# Patient Record
Sex: FEMALE | Race: WHITE | NOT HISPANIC OR LATINO | Employment: FULL TIME | URBAN - METROPOLITAN AREA
[De-identification: names, ages, dates, MRNs, and addresses within clinical notes are randomized per-mention and may not be internally consistent; named-entity substitution may affect disease eponyms.]

---

## 2020-07-27 ENCOUNTER — TELEPHONE (OUTPATIENT)
Dept: PEDIATRICS | Facility: CLINIC | Age: 20
End: 2020-07-27

## 2020-07-27 NOTE — TELEPHONE ENCOUNTER
Spoke to pt. Advised that she is due for 2nd meningitis vaccine. Unable to print  Certified shot record. Advised a vaccination summary report is printed and ready for . Verbalized understanding.

## 2020-07-27 NOTE — TELEPHONE ENCOUNTER
----- Message from Yobany Hirsch sent at 7/27/2020 11:54 AM CDT -----  Regarding: Patient Shot Records  Contact: Rosita Thomas  Pt would like to know if your office has her shot records. 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

## 2023-02-05 ENCOUNTER — HOSPITAL ENCOUNTER (OUTPATIENT)
Facility: HOSPITAL | Age: 23
LOS: 1 days | Discharge: HOME | End: 2023-02-07
Attending: EMERGENCY MEDICINE | Admitting: INTERNAL MEDICINE

## 2023-02-05 DIAGNOSIS — R07.89 FEELING OF CHEST TIGHTNESS: ICD-10-CM

## 2023-02-05 DIAGNOSIS — K80.00 CALCULUS OF GALLBLADDER WITH ACUTE CHOLECYSTITIS WITHOUT OBSTRUCTION: Primary | ICD-10-CM

## 2023-02-05 DIAGNOSIS — K81.9 CHOLECYSTITIS: ICD-10-CM

## 2023-02-05 LAB
ALBUMIN SERPL BCP-MCNC: 4.7 G/DL (ref 3.5–5.2)
ALP SERPL-CCNC: 55 U/L (ref 55–135)
ALT SERPL W/O P-5'-P-CCNC: 19 U/L (ref 10–44)
ANION GAP SERPL CALC-SCNC: 9 MMOL/L (ref 8–16)
AST SERPL-CCNC: 21 U/L (ref 10–40)
B-HCG UR QL: NEGATIVE
BASOPHILS # BLD AUTO: 0.03 K/UL (ref 0–0.2)
BASOPHILS NFR BLD: 0.4 % (ref 0–1.9)
BILIRUB SERPL-MCNC: 0.7 MG/DL (ref 0.1–1)
BILIRUB UR QL STRIP: NEGATIVE
BUN SERPL-MCNC: 9 MG/DL (ref 6–20)
CALCIUM SERPL-MCNC: 9.5 MG/DL (ref 8.7–10.5)
CHLORIDE SERPL-SCNC: 105 MMOL/L (ref 95–110)
CLARITY UR: CLEAR
CO2 SERPL-SCNC: 25 MMOL/L (ref 23–29)
COLOR UR: YELLOW
CREAT SERPL-MCNC: 0.9 MG/DL (ref 0.5–1.4)
CTP QC/QA: YES
DIFFERENTIAL METHOD: NORMAL
EOSINOPHIL # BLD AUTO: 0.1 K/UL (ref 0–0.5)
EOSINOPHIL NFR BLD: 0.8 % (ref 0–8)
ERYTHROCYTE [DISTWIDTH] IN BLOOD BY AUTOMATED COUNT: 12.6 % (ref 11.5–14.5)
EST. GFR  (NO RACE VARIABLE): >60 ML/MIN/1.73 M^2
GLUCOSE SERPL-MCNC: 85 MG/DL (ref 70–110)
GLUCOSE UR QL STRIP: NEGATIVE
HCT VFR BLD AUTO: 44.9 % (ref 37–48.5)
HGB BLD-MCNC: 14.9 G/DL (ref 12–16)
HGB UR QL STRIP: NEGATIVE
IMM GRANULOCYTES # BLD AUTO: 0.01 K/UL (ref 0–0.04)
IMM GRANULOCYTES NFR BLD AUTO: 0.1 % (ref 0–0.5)
KETONES UR QL STRIP: NEGATIVE
LEUKOCYTE ESTERASE UR QL STRIP: NEGATIVE
LIPASE SERPL-CCNC: 32 U/L (ref 4–60)
LYMPHOCYTES # BLD AUTO: 1.9 K/UL (ref 1–4.8)
LYMPHOCYTES NFR BLD: 26 % (ref 18–48)
MCH RBC QN AUTO: 29.7 PG (ref 27–31)
MCHC RBC AUTO-ENTMCNC: 33.2 G/DL (ref 32–36)
MCV RBC AUTO: 90 FL (ref 82–98)
MONOCYTES # BLD AUTO: 0.5 K/UL (ref 0.3–1)
MONOCYTES NFR BLD: 6.6 % (ref 4–15)
NEUTROPHILS # BLD AUTO: 4.9 K/UL (ref 1.8–7.7)
NEUTROPHILS NFR BLD: 66.1 % (ref 38–73)
NITRITE UR QL STRIP: NEGATIVE
NRBC BLD-RTO: 0 /100 WBC
PH UR STRIP: 7 [PH] (ref 5–8)
PLATELET # BLD AUTO: 338 K/UL (ref 150–450)
PMV BLD AUTO: 11.5 FL (ref 9.2–12.9)
POTASSIUM SERPL-SCNC: 3.6 MMOL/L (ref 3.5–5.1)
PROT SERPL-MCNC: 8.6 G/DL (ref 6–8.4)
PROT UR QL STRIP: NEGATIVE
RBC # BLD AUTO: 5.01 M/UL (ref 4–5.4)
SODIUM SERPL-SCNC: 139 MMOL/L (ref 136–145)
SP GR UR STRIP: 1.01 (ref 1–1.03)
URN SPEC COLLECT METH UR: NORMAL
UROBILINOGEN UR STRIP-ACNC: NEGATIVE EU/DL
WBC # BLD AUTO: 7.42 K/UL (ref 3.9–12.7)

## 2023-02-05 PROCEDURE — 80053 COMPREHEN METABOLIC PANEL: CPT | Performed by: NURSE PRACTITIONER

## 2023-02-05 PROCEDURE — 81025 URINE PREGNANCY TEST: CPT | Performed by: NURSE PRACTITIONER

## 2023-02-05 PROCEDURE — 25000003 PHARM REV CODE 250: Performed by: NURSE PRACTITIONER

## 2023-02-05 PROCEDURE — 83690 ASSAY OF LIPASE: CPT | Performed by: NURSE PRACTITIONER

## 2023-02-05 PROCEDURE — 96365 THER/PROPH/DIAG IV INF INIT: CPT

## 2023-02-05 PROCEDURE — 99285 EMERGENCY DEPT VISIT HI MDM: CPT | Mod: 25

## 2023-02-05 PROCEDURE — 63600175 PHARM REV CODE 636 W HCPCS: Performed by: NURSE PRACTITIONER

## 2023-02-05 PROCEDURE — 96375 TX/PRO/DX INJ NEW DRUG ADDON: CPT

## 2023-02-05 PROCEDURE — 12000002 HC ACUTE/MED SURGE SEMI-PRIVATE ROOM

## 2023-02-05 PROCEDURE — 85025 COMPLETE CBC W/AUTO DIFF WBC: CPT | Performed by: NURSE PRACTITIONER

## 2023-02-05 PROCEDURE — 81003 URINALYSIS AUTO W/O SCOPE: CPT | Performed by: NURSE PRACTITIONER

## 2023-02-05 RX ORDER — FAMOTIDINE 10 MG/ML
20 INJECTION INTRAVENOUS EVERY 12 HOURS
Status: DISCONTINUED | OUTPATIENT
Start: 2023-02-05 | End: 2023-02-07 | Stop reason: HOSPADM

## 2023-02-05 RX ORDER — MORPHINE SULFATE 4 MG/ML
4 INJECTION, SOLUTION INTRAMUSCULAR; INTRAVENOUS
Status: COMPLETED | OUTPATIENT
Start: 2023-02-05 | End: 2023-02-05

## 2023-02-05 RX ORDER — TALC
6 POWDER (GRAM) TOPICAL NIGHTLY PRN
Status: DISCONTINUED | OUTPATIENT
Start: 2023-02-05 | End: 2023-02-07 | Stop reason: HOSPADM

## 2023-02-05 RX ORDER — POLYETHYLENE GLYCOL 3350 17 G/17G
17 POWDER, FOR SOLUTION ORAL DAILY
Status: DISCONTINUED | OUTPATIENT
Start: 2023-02-06 | End: 2023-02-07 | Stop reason: HOSPADM

## 2023-02-05 RX ORDER — SODIUM CHLORIDE, SODIUM LACTATE, POTASSIUM CHLORIDE, CALCIUM CHLORIDE 600; 310; 30; 20 MG/100ML; MG/100ML; MG/100ML; MG/100ML
INJECTION, SOLUTION INTRAVENOUS CONTINUOUS
Status: DISCONTINUED | OUTPATIENT
Start: 2023-02-05 | End: 2023-02-07 | Stop reason: HOSPADM

## 2023-02-05 RX ORDER — PROCHLORPERAZINE EDISYLATE 5 MG/ML
5 INJECTION INTRAMUSCULAR; INTRAVENOUS EVERY 6 HOURS PRN
Status: DISCONTINUED | OUTPATIENT
Start: 2023-02-05 | End: 2023-02-07 | Stop reason: HOSPADM

## 2023-02-05 RX ORDER — POLYETHYLENE GLYCOL 3350 17 G/17G
17 POWDER, FOR SOLUTION ORAL DAILY
Status: DISCONTINUED | OUTPATIENT
Start: 2023-02-06 | End: 2023-02-05

## 2023-02-05 RX ORDER — SODIUM CHLORIDE 0.9 % (FLUSH) 0.9 %
3 SYRINGE (ML) INJECTION EVERY 6 HOURS PRN
Status: DISCONTINUED | OUTPATIENT
Start: 2023-02-05 | End: 2023-02-07 | Stop reason: HOSPADM

## 2023-02-05 RX ORDER — ONDANSETRON 4 MG/1
8 TABLET, ORALLY DISINTEGRATING ORAL EVERY 8 HOURS PRN
Status: DISCONTINUED | OUTPATIENT
Start: 2023-02-05 | End: 2023-02-07 | Stop reason: HOSPADM

## 2023-02-05 RX ORDER — MORPHINE SULFATE 4 MG/ML
4 INJECTION, SOLUTION INTRAMUSCULAR; INTRAVENOUS EVERY 4 HOURS PRN
Status: DISCONTINUED | OUTPATIENT
Start: 2023-02-05 | End: 2023-02-07

## 2023-02-05 RX ORDER — ONDANSETRON 2 MG/ML
4 INJECTION INTRAMUSCULAR; INTRAVENOUS
Status: COMPLETED | OUTPATIENT
Start: 2023-02-05 | End: 2023-02-05

## 2023-02-05 RX ORDER — MAG HYDROX/ALUMINUM HYD/SIMETH 200-200-20
30 SUSPENSION, ORAL (FINAL DOSE FORM) ORAL ONCE
Status: DISCONTINUED | OUTPATIENT
Start: 2023-02-05 | End: 2023-02-05

## 2023-02-05 RX ORDER — MORPHINE SULFATE 2 MG/ML
2 INJECTION, SOLUTION INTRAMUSCULAR; INTRAVENOUS EVERY 4 HOURS PRN
Status: DISCONTINUED | OUTPATIENT
Start: 2023-02-05 | End: 2023-02-07

## 2023-02-05 RX ORDER — LIDOCAINE HYDROCHLORIDE 20 MG/ML
15 SOLUTION OROPHARYNGEAL ONCE
Status: DISCONTINUED | OUTPATIENT
Start: 2023-02-05 | End: 2023-02-05

## 2023-02-05 RX ADMIN — PIPERACILLIN SODIUM AND TAZOBACTAM SODIUM 4.5 G: 4; .5 INJECTION, POWDER, LYOPHILIZED, FOR SOLUTION INTRAVENOUS at 06:02

## 2023-02-05 RX ADMIN — SODIUM CHLORIDE 1000 ML: 0.9 INJECTION, SOLUTION INTRAVENOUS at 06:02

## 2023-02-05 RX ADMIN — MORPHINE SULFATE 4 MG: 4 INJECTION, SOLUTION INTRAMUSCULAR; INTRAVENOUS at 05:02

## 2023-02-05 RX ADMIN — SODIUM CHLORIDE, SODIUM LACTATE, POTASSIUM CHLORIDE, AND CALCIUM CHLORIDE: .6; .31; .03; .02 INJECTION, SOLUTION INTRAVENOUS at 08:02

## 2023-02-05 RX ADMIN — ONDANSETRON 4 MG: 2 INJECTION INTRAMUSCULAR; INTRAVENOUS at 06:02

## 2023-02-05 RX ADMIN — FAMOTIDINE 20 MG: 10 INJECTION INTRAVENOUS at 08:02

## 2023-02-05 NOTE — ED PROVIDER NOTES
Encounter Date: 2/5/2023       History     Chief Complaint   Patient presents with    Abdominal Pain     RUQ since yest     Rosita Thomas is a 22 year old female with no pmh presenting to the ED with c/o epigastric and RUQ abdominal pain. She states that she began having pain approximately 2 weeks ago that was unchanged with eating. She began having worsening pain over the past two days. She denies fever, vomiting, diarrhea, urinary symptoms. She has taken no medication for pain.     Review of patient's allergies indicates:  No Known Allergies  No past medical history on file.  No past surgical history on file.  Family History   Problem Relation Age of Onset    Stroke Maternal Grandfather      Social History     Tobacco Use    Smoking status: Passive Smoke Exposure - Never Smoker    Tobacco comments:     step mother smokes outside only     Review of Systems   Constitutional:  Negative for fever.   HENT:  Negative for sore throat.    Respiratory:  Negative for shortness of breath.    Cardiovascular:  Negative for chest pain.   Gastrointestinal:  Positive for abdominal pain. Negative for diarrhea, nausea and vomiting.   Genitourinary:  Negative for dysuria.   Musculoskeletal:  Negative for back pain.   Skin:  Negative for rash.   Neurological:  Negative for weakness.   Hematological:  Does not bruise/bleed easily.     Physical Exam     Initial Vitals [02/05/23 1348]   BP Pulse Resp Temp SpO2   (!) 142/88 89 16 99.1 °F (37.3 °C) 100 %      MAP       --         Physical Exam    Nursing note and vitals reviewed.  Constitutional: She appears well-developed and well-nourished. She is not diaphoretic. No distress.   HENT:   Head: Normocephalic and atraumatic.   Mouth/Throat: Oropharynx is clear and moist.   Eyes: Conjunctivae are normal.   Neck: Neck supple.   Cardiovascular:  Normal rate, regular rhythm, normal heart sounds and intact distal pulses.     Exam reveals no gallop and no friction rub.       No murmur  heard.  Pulmonary/Chest: Breath sounds normal. No respiratory distress. She has no wheezes. She has no rhonchi. She has no rales.   Abdominal: Abdomen is soft. She exhibits no distension. There is abdominal tenderness (epigastric, RUQ). There is no guarding.   Musculoskeletal:         General: Normal range of motion.      Cervical back: Neck supple.     Neurological: She is alert and oriented to person, place, and time.   Skin: No rash noted. No erythema.   Psychiatric: Her speech is normal.       ED Course   Procedures  Labs Reviewed   URINALYSIS, REFLEX TO URINE CULTURE    Narrative:     Specimen Source->Urine   CBC W/ AUTO DIFFERENTIAL   COMPREHENSIVE METABOLIC PANEL   LIPASE   POCT URINE PREGNANCY          Imaging Results              US Abdomen Limited (Final result)  Result time 02/05/23 15:34:55      Final result by Benji Aquino MD (02/05/23 15:34:55)                   Narrative:    HISTORY: epigastric, RUQ pain    FINDINGS: Comparison to the CT of 12/08/2015. The visualized pancreas has normal echotexture with no peripancreatic fluid collections. The liver is normal in size and echotexture, 16 cm in length, without focal lesions or intrahepatic biliary ductal dilatation.    The gallbladder contains multiple echogenic, shadowing mobile gallstones, with diffuse gallbladder wall thickening. The gallbladder wall contains a few echogenic foci with ringdown artifact. No pericholecystic fluid. The common duct measures 4 mm in diameter.    The right kidney measures 10.8 cm in length, with normal parenchymal echotexture, and no echogenic calculi or hydronephrosis. The visualized abdominal aorta, IVC and main portal vein are normal. There is no ascites.    IMPRESSION:  1. Cholelithiasis, with diffuse gallbladder wall thickening. Please correlate with any clinical suspicion of acute cholecystitis.  2. Findings suggestive of gallbladder cholesterolosis.    Electronically signed by:  Benji Aquino MD  2/5/2023 3:34 PM  CST Workstation: [id number]                                     Medications - No data to display        APC / Resident Notes:   MDM    Patient presents for emergent evaluation of acute abdominal pain, nausea that poses a possible threat to life and/or bodily function.    In the ED patient found to have acute cholelithiasis with cholecystitis.   I ordered labs and personally reviewed them.  Labs significant for no leukocytosis, normal bilirubin and liver enzymes.  She has had no fever or vomiting.    I ordered ultrasound and personally reviewed it and reviewed the radiologist interpretation.  Ultrasound significant for cholelithiasis, with diffuse gallbladder wall thickening. Please correlate with any clinical suspicion of acute cholecystitis.  2. Findings suggestive of gallbladder cholesterolosis.      Admission MDM  I discussed the patient presentation labs, ekg, X-rays, CT findings with the consultant(s) Dr. Burger   Patient was managed in the ED with IV zosyn, morphine, zofran, IV fluids.    The response to treatment was improvement of pain.    Patient required emergent consultation to hospital medicine for admission.                           Clinical Impression:   Final diagnoses:  [K81.9] Cholecystitis  [K80.00] Calculus of gallbladder with acute cholecystitis without obstruction (Primary)               Tami Limon NP  02/05/23 0471

## 2023-02-06 ENCOUNTER — ANESTHESIA EVENT (OUTPATIENT)
Dept: SURGERY | Facility: HOSPITAL | Age: 23
End: 2023-02-06

## 2023-02-06 ENCOUNTER — ANESTHESIA (OUTPATIENT)
Dept: SURGERY | Facility: HOSPITAL | Age: 23
End: 2023-02-06

## 2023-02-06 LAB
ALBUMIN SERPL BCP-MCNC: 3.9 G/DL (ref 3.5–5.2)
ALP SERPL-CCNC: 47 U/L (ref 55–135)
ALT SERPL W/O P-5'-P-CCNC: 18 U/L (ref 10–44)
ANION GAP SERPL CALC-SCNC: 9 MMOL/L (ref 8–16)
AST SERPL-CCNC: 16 U/L (ref 10–40)
BASOPHILS # BLD AUTO: 0.03 K/UL (ref 0–0.2)
BASOPHILS NFR BLD: 0.5 % (ref 0–1.9)
BILIRUB SERPL-MCNC: 0.9 MG/DL (ref 0.1–1)
BUN SERPL-MCNC: 8 MG/DL (ref 6–20)
CALCIUM SERPL-MCNC: 9 MG/DL (ref 8.7–10.5)
CHLORIDE SERPL-SCNC: 108 MMOL/L (ref 95–110)
CO2 SERPL-SCNC: 23 MMOL/L (ref 23–29)
CREAT SERPL-MCNC: 0.9 MG/DL (ref 0.5–1.4)
DIFFERENTIAL METHOD: NORMAL
EOSINOPHIL # BLD AUTO: 0.1 K/UL (ref 0–0.5)
EOSINOPHIL NFR BLD: 1.4 % (ref 0–8)
ERYTHROCYTE [DISTWIDTH] IN BLOOD BY AUTOMATED COUNT: 12.7 % (ref 11.5–14.5)
EST. GFR  (NO RACE VARIABLE): >60 ML/MIN/1.73 M^2
GLUCOSE SERPL-MCNC: 84 MG/DL (ref 70–110)
HCT VFR BLD AUTO: 41.6 % (ref 37–48.5)
HGB BLD-MCNC: 13.4 G/DL (ref 12–16)
IMM GRANULOCYTES # BLD AUTO: 0.02 K/UL (ref 0–0.04)
IMM GRANULOCYTES NFR BLD AUTO: 0.3 % (ref 0–0.5)
LYMPHOCYTES # BLD AUTO: 2.3 K/UL (ref 1–4.8)
LYMPHOCYTES NFR BLD: 40 % (ref 18–48)
MCH RBC QN AUTO: 30 PG (ref 27–31)
MCHC RBC AUTO-ENTMCNC: 32.2 G/DL (ref 32–36)
MCV RBC AUTO: 93 FL (ref 82–98)
MONOCYTES # BLD AUTO: 0.5 K/UL (ref 0.3–1)
MONOCYTES NFR BLD: 8.3 % (ref 4–15)
NEUTROPHILS # BLD AUTO: 2.9 K/UL (ref 1.8–7.7)
NEUTROPHILS NFR BLD: 49.5 % (ref 38–73)
NRBC BLD-RTO: 0 /100 WBC
PLATELET # BLD AUTO: 265 K/UL (ref 150–450)
PMV BLD AUTO: 11.7 FL (ref 9.2–12.9)
POTASSIUM SERPL-SCNC: 3.5 MMOL/L (ref 3.5–5.1)
PROT SERPL-MCNC: 7.1 G/DL (ref 6–8.4)
RBC # BLD AUTO: 4.47 M/UL (ref 4–5.4)
SODIUM SERPL-SCNC: 140 MMOL/L (ref 136–145)
WBC # BLD AUTO: 5.78 K/UL (ref 3.9–12.7)

## 2023-02-06 PROCEDURE — 47562 LAPAROSCOPIC CHOLECYSTECTOMY: CPT | Mod: ,,, | Performed by: SURGERY

## 2023-02-06 PROCEDURE — 37000009 HC ANESTHESIA EA ADD 15 MINS: Performed by: SURGERY

## 2023-02-06 PROCEDURE — 63600175 PHARM REV CODE 636 W HCPCS: Performed by: NURSE ANESTHETIST, CERTIFIED REGISTERED

## 2023-02-06 PROCEDURE — 96366 THER/PROPH/DIAG IV INF ADDON: CPT | Mod: 59

## 2023-02-06 PROCEDURE — D9220A PRA ANESTHESIA: Mod: ,,, | Performed by: ANESTHESIOLOGY

## 2023-02-06 PROCEDURE — 63600175 PHARM REV CODE 636 W HCPCS: Performed by: SURGERY

## 2023-02-06 PROCEDURE — 96361 HYDRATE IV INFUSION ADD-ON: CPT | Mod: 59

## 2023-02-06 PROCEDURE — 80053 COMPREHEN METABOLIC PANEL: CPT | Performed by: NURSE PRACTITIONER

## 2023-02-06 PROCEDURE — 36415 COLL VENOUS BLD VENIPUNCTURE: CPT | Performed by: NURSE PRACTITIONER

## 2023-02-06 PROCEDURE — 27201423 OPTIME MED/SURG SUP & DEVICES STERILE SUPPLY: Performed by: SURGERY

## 2023-02-06 PROCEDURE — G0378 HOSPITAL OBSERVATION PER HR: HCPCS

## 2023-02-06 PROCEDURE — 25000003 PHARM REV CODE 250: Performed by: ANESTHESIOLOGY

## 2023-02-06 PROCEDURE — 99204 OFFICE O/P NEW MOD 45 MIN: CPT | Mod: 57,,, | Performed by: SURGERY

## 2023-02-06 PROCEDURE — 37000008 HC ANESTHESIA 1ST 15 MINUTES: Performed by: SURGERY

## 2023-02-06 PROCEDURE — 47562 PR LAP,CHOLECYSTECTOMY: ICD-10-PCS | Mod: ,,, | Performed by: SURGERY

## 2023-02-06 PROCEDURE — 71000033 HC RECOVERY, INTIAL HOUR: Performed by: SURGERY

## 2023-02-06 PROCEDURE — 25000003 PHARM REV CODE 250: Performed by: NURSE ANESTHETIST, CERTIFIED REGISTERED

## 2023-02-06 PROCEDURE — 36000708 HC OR TIME LEV III 1ST 15 MIN: Performed by: SURGERY

## 2023-02-06 PROCEDURE — 63600175 PHARM REV CODE 636 W HCPCS: Performed by: NURSE PRACTITIONER

## 2023-02-06 PROCEDURE — 25000003 PHARM REV CODE 250: Performed by: SURGERY

## 2023-02-06 PROCEDURE — 85025 COMPLETE CBC W/AUTO DIFF WBC: CPT | Performed by: NURSE PRACTITIONER

## 2023-02-06 PROCEDURE — 96375 TX/PRO/DX INJ NEW DRUG ADDON: CPT | Mod: 59

## 2023-02-06 PROCEDURE — 71000039 HC RECOVERY, EACH ADD'L HOUR: Performed by: SURGERY

## 2023-02-06 PROCEDURE — D9220A PRA ANESTHESIA: ICD-10-PCS | Mod: ,,, | Performed by: ANESTHESIOLOGY

## 2023-02-06 PROCEDURE — 25000003 PHARM REV CODE 250: Performed by: NURSE PRACTITIONER

## 2023-02-06 PROCEDURE — 63600175 PHARM REV CODE 636 W HCPCS: Performed by: ANESTHESIOLOGY

## 2023-02-06 PROCEDURE — 99204 PR OFFICE/OUTPT VISIT, NEW, LEVL IV, 45-59 MIN: ICD-10-PCS | Mod: 57,,, | Performed by: SURGERY

## 2023-02-06 PROCEDURE — 27000080 OPTIME MED/SURG SUP & DEVICES GENERAL CLASSIFICATION: Performed by: SURGERY

## 2023-02-06 PROCEDURE — 36000709 HC OR TIME LEV III EA ADD 15 MIN: Performed by: SURGERY

## 2023-02-06 RX ORDER — PROPOFOL 10 MG/ML
VIAL (ML) INTRAVENOUS
Status: DISCONTINUED | OUTPATIENT
Start: 2023-02-06 | End: 2023-02-07

## 2023-02-06 RX ORDER — BUPIVACAINE HYDROCHLORIDE AND EPINEPHRINE 5; 5 MG/ML; UG/ML
INJECTION, SOLUTION EPIDURAL; INTRACAUDAL; PERINEURAL
Status: DISCONTINUED | OUTPATIENT
Start: 2023-02-06 | End: 2023-02-06 | Stop reason: HOSPADM

## 2023-02-06 RX ORDER — DIPHENHYDRAMINE HYDROCHLORIDE 50 MG/ML
12.5 INJECTION INTRAMUSCULAR; INTRAVENOUS
Status: DISCONTINUED | OUTPATIENT
Start: 2023-02-06 | End: 2023-02-06 | Stop reason: HOSPADM

## 2023-02-06 RX ORDER — ROCURONIUM BROMIDE 10 MG/ML
INJECTION, SOLUTION INTRAVENOUS
Status: DISCONTINUED | OUTPATIENT
Start: 2023-02-06 | End: 2023-02-07

## 2023-02-06 RX ORDER — SUCCINYLCHOLINE CHLORIDE 20 MG/ML
INJECTION INTRAMUSCULAR; INTRAVENOUS
Status: DISCONTINUED | OUTPATIENT
Start: 2023-02-06 | End: 2023-02-07

## 2023-02-06 RX ORDER — LIDOCAINE HYDROCHLORIDE 10 MG/ML
INJECTION, SOLUTION INTRAVENOUS
Status: DISCONTINUED | OUTPATIENT
Start: 2023-02-06 | End: 2023-02-07

## 2023-02-06 RX ORDER — MIDAZOLAM HYDROCHLORIDE 5 MG/ML
INJECTION INTRAMUSCULAR; INTRAVENOUS
Status: DISCONTINUED | OUTPATIENT
Start: 2023-02-06 | End: 2023-02-07

## 2023-02-06 RX ORDER — ONDANSETRON 2 MG/ML
4 INJECTION INTRAMUSCULAR; INTRAVENOUS DAILY PRN
Status: DISCONTINUED | OUTPATIENT
Start: 2023-02-06 | End: 2023-02-06 | Stop reason: HOSPADM

## 2023-02-06 RX ORDER — OXYCODONE HYDROCHLORIDE 5 MG/1
5 TABLET ORAL
Status: DISCONTINUED | OUTPATIENT
Start: 2023-02-06 | End: 2023-02-06 | Stop reason: HOSPADM

## 2023-02-06 RX ORDER — FAMOTIDINE 10 MG/ML
INJECTION INTRAVENOUS
Status: DISCONTINUED | OUTPATIENT
Start: 2023-02-06 | End: 2023-02-07

## 2023-02-06 RX ORDER — ACETAMINOPHEN 10 MG/ML
INJECTION, SOLUTION INTRAVENOUS
Status: DISCONTINUED | OUTPATIENT
Start: 2023-02-06 | End: 2023-02-07

## 2023-02-06 RX ORDER — HYDROMORPHONE HYDROCHLORIDE 1 MG/ML
0.2 INJECTION, SOLUTION INTRAMUSCULAR; INTRAVENOUS; SUBCUTANEOUS EVERY 5 MIN PRN
Status: COMPLETED | OUTPATIENT
Start: 2023-02-06 | End: 2023-02-06

## 2023-02-06 RX ORDER — ONDANSETRON 2 MG/ML
INJECTION INTRAMUSCULAR; INTRAVENOUS
Status: DISCONTINUED | OUTPATIENT
Start: 2023-02-06 | End: 2023-02-07

## 2023-02-06 RX ORDER — DEXAMETHASONE SODIUM PHOSPHATE 4 MG/ML
INJECTION, SOLUTION INTRA-ARTICULAR; INTRALESIONAL; INTRAMUSCULAR; INTRAVENOUS; SOFT TISSUE
Status: DISCONTINUED | OUTPATIENT
Start: 2023-02-06 | End: 2023-02-07

## 2023-02-06 RX ORDER — LORAZEPAM 2 MG/ML
0.5 INJECTION INTRAMUSCULAR EVERY 4 HOURS PRN
Status: DISCONTINUED | OUTPATIENT
Start: 2023-02-06 | End: 2023-02-07 | Stop reason: HOSPADM

## 2023-02-06 RX ORDER — FENTANYL CITRATE 50 UG/ML
INJECTION, SOLUTION INTRAMUSCULAR; INTRAVENOUS
Status: DISCONTINUED | OUTPATIENT
Start: 2023-02-06 | End: 2023-02-07

## 2023-02-06 RX ORDER — OXYCODONE AND ACETAMINOPHEN 5; 325 MG/1; MG/1
1 TABLET ORAL EVERY 4 HOURS PRN
Status: DISCONTINUED | OUTPATIENT
Start: 2023-02-06 | End: 2023-02-07 | Stop reason: HOSPADM

## 2023-02-06 RX ADMIN — MORPHINE SULFATE 4 MG: 4 INJECTION, SOLUTION INTRAMUSCULAR; INTRAVENOUS at 02:02

## 2023-02-06 RX ADMIN — ACETAMINOPHEN 1000 MG: 10 INJECTION, SOLUTION INTRAVENOUS at 11:02

## 2023-02-06 RX ADMIN — SODIUM CHLORIDE, SODIUM LACTATE, POTASSIUM CHLORIDE, AND CALCIUM CHLORIDE: .6; .31; .03; .02 INJECTION, SOLUTION INTRAVENOUS at 10:02

## 2023-02-06 RX ADMIN — HYDROMORPHONE HYDROCHLORIDE 0.2 MG: 1 INJECTION, SOLUTION INTRAMUSCULAR; INTRAVENOUS; SUBCUTANEOUS at 12:02

## 2023-02-06 RX ADMIN — ROCURONIUM BROMIDE 5 MG: 10 INJECTION, SOLUTION INTRAVENOUS at 10:02

## 2023-02-06 RX ADMIN — SUGAMMADEX 200 MG: 100 INJECTION, SOLUTION INTRAVENOUS at 11:02

## 2023-02-06 RX ADMIN — SODIUM CHLORIDE, SODIUM LACTATE, POTASSIUM CHLORIDE, AND CALCIUM CHLORIDE 1000 ML: .6; .31; .03; .02 INJECTION, SOLUTION INTRAVENOUS at 03:02

## 2023-02-06 RX ADMIN — FAMOTIDINE 20 MG: 10 INJECTION INTRAVENOUS at 09:02

## 2023-02-06 RX ADMIN — ROCURONIUM BROMIDE 45 MG: 10 INJECTION, SOLUTION INTRAVENOUS at 10:02

## 2023-02-06 RX ADMIN — OXYCODONE HYDROCHLORIDE 5 MG: 5 TABLET ORAL at 12:02

## 2023-02-06 RX ADMIN — ONDANSETRON 4 MG: 2 INJECTION INTRAMUSCULAR; INTRAVENOUS at 12:02

## 2023-02-06 RX ADMIN — HYDROMORPHONE HYDROCHLORIDE 0.2 MG: 1 INJECTION, SOLUTION INTRAMUSCULAR; INTRAVENOUS; SUBCUTANEOUS at 11:02

## 2023-02-06 RX ADMIN — OXYCODONE AND ACETAMINOPHEN 1 TABLET: 325; 5 TABLET ORAL at 06:02

## 2023-02-06 RX ADMIN — FENTANYL CITRATE 100 MCG: 50 INJECTION INTRAMUSCULAR; INTRAVENOUS at 10:02

## 2023-02-06 RX ADMIN — MIDAZOLAM HYDROCHLORIDE 2 MG: 5 INJECTION, SOLUTION INTRAMUSCULAR; INTRAVENOUS at 10:02

## 2023-02-06 RX ADMIN — PROPOFOL 200 MG: 10 INJECTION, EMULSION INTRAVENOUS at 10:02

## 2023-02-06 RX ADMIN — Medication 140 MG: at 10:02

## 2023-02-06 RX ADMIN — FAMOTIDINE 20 MG: 10 INJECTION INTRAVENOUS at 07:02

## 2023-02-06 RX ADMIN — FAMOTIDINE 20 MG: 10 INJECTION, SOLUTION INTRAVENOUS at 10:02

## 2023-02-06 RX ADMIN — PIPERACILLIN SODIUM AND TAZOBACTAM SODIUM 3.38 G: 3; .375 INJECTION, POWDER, LYOPHILIZED, FOR SOLUTION INTRAVENOUS at 02:02

## 2023-02-06 RX ADMIN — DEXAMETHASONE SODIUM PHOSPHATE 8 MG: 4 INJECTION, SOLUTION INTRAMUSCULAR; INTRAVENOUS at 10:02

## 2023-02-06 RX ADMIN — LIDOCAINE HYDROCHLORIDE 100 MG: 10 INJECTION, SOLUTION INTRAVENOUS at 10:02

## 2023-02-06 RX ADMIN — PROCHLORPERAZINE EDISYLATE 5 MG: 5 INJECTION INTRAMUSCULAR; INTRAVENOUS at 03:02

## 2023-02-06 RX ADMIN — MORPHINE SULFATE 2 MG: 2 INJECTION, SOLUTION INTRAMUSCULAR; INTRAVENOUS at 07:02

## 2023-02-06 RX ADMIN — PIPERACILLIN SODIUM AND TAZOBACTAM SODIUM 3.38 G: 3; .375 INJECTION, POWDER, LYOPHILIZED, FOR SOLUTION INTRAVENOUS at 10:02

## 2023-02-06 RX ADMIN — PIPERACILLIN SODIUM AND TAZOBACTAM SODIUM 3.38 G: 3; .375 INJECTION, POWDER, LYOPHILIZED, FOR SOLUTION INTRAVENOUS at 06:02

## 2023-02-06 RX ADMIN — ONDANSETRON 4 MG: 2 INJECTION INTRAMUSCULAR; INTRAVENOUS at 10:02

## 2023-02-06 NOTE — BRIEF OP NOTE
[facility]  Brief Operative Note    SUMMARY     Surgery Date: 2/6/2023     Surgeon(s) and Role:     * Nathan Burger MD - Primary    Assisting Surgeon: None    Pre-op Diagnosis:  Calculus of gallbladder with acute cholecystitis without obstruction [K80.00]    Post-op Diagnosis:  Post-Op Diagnosis Codes:     * Calculus of gallbladder with acute cholecystitis without obstruction [K80.00]    Procedure(s) (LRB):  CHOLECYSTECTOMY, LAPAROSCOPIC (N/A)    Anesthesia: General    Operative Findings: Distended gallbladder    Estimated Blood Loss: 25 cc's  Estimated Blood Loss has been documented.         Specimens:   Specimen (24h ago, onward)       Start     Ordered    02/06/23 1114  Specimen to Pathology - Surgery  Once        Comments: Pre-op Diagnosis: Calculus of gallbladder with acute cholecystitis without obstruction [K80.00]Procedure(s):CHOLECYSTECTOMY, LAPAROSCOPIC Number of specimens: 1Name of specimens: gallbladder     Question:  Release to patient  Answer:  Immediate    02/06/23 1125                    [id number]

## 2023-02-06 NOTE — SUBJECTIVE & OBJECTIVE
No past medical history on file.    No past surgical history on file.    Review of patient's allergies indicates:  No Known Allergies    No current facility-administered medications on file prior to encounter.     Current Outpatient Medications on File Prior to Encounter   Medication Sig    ibuprofen (ADVIL,MOTRIN) 100 MG tablet Take 100 mg by mouth every 6 (six) hours as needed for Temperature greater than.    [DISCONTINUED] nitrofurantoin, macrocrystal-monohydrate, (MACROBID) 100 MG capsule Take 100 mg by mouth 2 (two) times daily.     Family History       Problem Relation (Age of Onset)    Stroke Maternal Grandfather          Tobacco Use    Smoking status: Passive Smoke Exposure - Never Smoker    Smokeless tobacco: Not on file    Tobacco comments:     step mother smokes outside only   Substance and Sexual Activity    Alcohol use: Not on file    Drug use: Not on file    Sexual activity: Not on file     Review of Systems   Constitutional:  Positive for activity change and appetite change. Negative for chills, diaphoresis and fatigue.   HENT:  Negative for congestion, ear pain, sinus pain and voice change.    Respiratory:  Negative for cough, choking, chest tightness and shortness of breath.    Cardiovascular:  Negative for chest pain, palpitations and leg swelling.   Gastrointestinal:  Positive for abdominal pain, nausea and vomiting. Negative for abdominal distention and blood in stool.   Endocrine: Negative for cold intolerance and heat intolerance.   Genitourinary:  Negative for dysuria, enuresis, flank pain and hematuria.   Musculoskeletal:  Negative for arthralgias, joint swelling and neck stiffness.   Skin:  Negative for color change, pallor and rash.   Neurological:  Negative for dizziness, facial asymmetry, light-headedness and headaches.   Hematological:  Negative for adenopathy. Does not bruise/bleed easily.   Psychiatric/Behavioral:  Negative for agitation, behavioral problems and confusion.    All other  systems reviewed and are negative.  Objective:     Vital Signs (Most Recent):  Temp: 99.1 °F (37.3 °C) (02/05/23 1348)  Pulse: 77 (02/05/23 1822)  Resp: 20 (02/05/23 1822)  BP: 137/66 (02/05/23 1822)  SpO2: 100 % (02/05/23 1721) Vital Signs (24h Range):  Temp:  [99.1 °F (37.3 °C)] 99.1 °F (37.3 °C)  Pulse:  [77-89] 77  Resp:  [14-20] 20  SpO2:  [100 %] 100 %  BP: (137-148)/(66-88) 137/66     Weight: 101.6 kg (224 lb)  Body mass index is 34.06 kg/m².    Physical Exam  Vitals and nursing note reviewed.   Constitutional:       Appearance: She is obese.   HENT:      Head: Normocephalic and atraumatic.      Right Ear: Tympanic membrane, ear canal and external ear normal.      Left Ear: Tympanic membrane, ear canal and external ear normal.      Nose: Nose normal.      Mouth/Throat:      Mouth: Mucous membranes are dry.      Pharynx: Oropharynx is clear.   Eyes:      Extraocular Movements: Extraocular movements intact.      Conjunctiva/sclera: Conjunctivae normal.      Pupils: Pupils are equal, round, and reactive to light.   Cardiovascular:      Rate and Rhythm: Normal rate and regular rhythm.      Pulses: Normal pulses.      Heart sounds: Normal heart sounds.   Pulmonary:      Effort: Pulmonary effort is normal.      Breath sounds: Normal breath sounds.   Abdominal:      General: Abdomen is flat. Bowel sounds are normal.      Palpations: Abdomen is soft.      Tenderness: There is abdominal tenderness. There is guarding.   Genitourinary:     Rectum: Normal.   Musculoskeletal:         General: Normal range of motion.      Cervical back: Normal range of motion and neck supple.   Skin:     General: Skin is warm and dry.      Capillary Refill: Capillary refill takes less than 2 seconds.   Neurological:      General: No focal deficit present.      Mental Status: She is alert and oriented to person, place, and time.   Psychiatric:         Mood and Affect: Mood normal.         Behavior: Behavior normal.         Thought Content:  Thought content normal.         Judgment: Judgment normal.         CRANIAL NERVES     CN III, IV, VI   Pupils are equal, round, and reactive to light.     Significant Labs: All pertinent labs within the past 24 hours have been reviewed.  Recent Lab Results         02/05/23  1556   02/05/23  1350        Albumin 4.7         Alkaline Phosphatase 55         ALT 19         Anion Gap 9         Appearance, UA   Clear       AST 21         Baso # 0.03         Basophil % 0.4         Bilirubin (UA)   Negative       BILIRUBIN TOTAL 0.7  Comment: For infants and newborns, interpretation of results should be based  on gestational age, weight and in agreement with clinical  observations.    Premature Infant recommended reference ranges:  Up to 24 hours.............<8.0 mg/dL  Up to 48 hours............<12.0 mg/dL  3-5 days..................<15.0 mg/dL  6-29 days.................<15.0 mg/dL           BUN 9         Calcium 9.5         Chloride 105         CO2 25         Color, UA   Yellow       Creatinine 0.9         Differential Method Automated         eGFR >60.0         Eos # 0.1         Eosinophil % 0.8         Glucose 85         Glucose, UA   Negative       Gran # (ANC) 4.9         Gran % 66.1         Hematocrit 44.9         Hemoglobin 14.9         Immature Grans (Abs) 0.01  Comment: Mild elevation in immature granulocytes is non specific and   can be seen in a variety of conditions including stress response,   acute inflammation, trauma and pregnancy. Correlation with other   laboratory and clinical findings is essential.           Immature Granulocytes 0.1         Ketones, UA   Negative       Leukocytes, UA   Negative       Lipase 32         Lymph # 1.9         Lymph % 26.0         MCH 29.7         MCHC 33.2         MCV 90         Mono # 0.5         Mono % 6.6         MPV 11.5         NITRITE UA   Negative       nRBC 0         Occult Blood UA   Negative       pH, UA   7.0       Platelets 338         Potassium 3.6         Preg  Test, Ur   Negative       PROTEIN TOTAL 8.6         Protein, UA   Negative  Comment: Recommend a 24 hour urine protein or a urine   protein/creatinine ratio if globulin induced proteinuria is  clinically suspected.          Acceptable   Yes       RBC 5.01         RDW 12.6         Sodium 139         Specific [place], UA   1.010       Specimen UA   Urine, Clean Catch       UROBILINOGEN UA   Negative       WBC 7.42                 Significant Imaging: I have reviewed all pertinent imaging results/findings within the past 24 hours.

## 2023-02-06 NOTE — CONSULTS
[facility]  General Surgery  Consult Note    Patient Name: Rosita Thomas  MRN: [id number]  Code Status: Full Code  Admission Date: 2/5/2023  Hospital Length of Stay: 1 days  Attending Physician: Sander Anthony MD  Primary Care Provider: Emir Butcher MD    Patient information was obtained from patient and ER records.     Inpatient consult to General surgery  Consult performed by: Nathan Burger MD  Consult ordered by: BARON Blood        Subjective:     Principal Problem: <principal problem not specified>    History of Present Illness: This is a pleasant 22-year-old female who presented to the hospital overnight with abdominal pain in the epigastric and right upper quadrant.  She notes the pain and present proximally 24 hours.  She denied nausea or vomiting.  Reports no fevers or chills.  In the ER she had workup consisting of labs and ultrasound with findings consistent with acute cholecystitis.  She was admitted and started on antibiotics kept NPO.  Patient with no significant medical or surgical history.      No current facility-administered medications on file prior to encounter.     Current Outpatient Medications on File Prior to Encounter   Medication Sig    ibuprofen (ADVIL,MOTRIN) 100 MG tablet Take 100 mg by mouth every 6 (six) hours as needed for Temperature greater than.       Review of patient's allergies indicates:  No Known Allergies    History reviewed. No pertinent past medical history.  History reviewed. No pertinent surgical history.  Family History       Problem Relation (Age of Onset)    Stroke Maternal Grandfather          Tobacco Use    Smoking status: Passive Smoke Exposure - Never Smoker    Smokeless tobacco: Not on file    Tobacco comments:     step mother smokes outside only   Substance and Sexual Activity    Alcohol use: Not on file    Drug use: Not on file    Sexual activity: Not on file     Review of Systems   Constitutional:  Negative for  activity change.   Gastrointestinal:  Positive for abdominal pain.   Musculoskeletal:  Negative for arthralgias.   Skin:  Negative for color change and wound.   Hematological:  Negative for adenopathy. Does not bruise/bleed easily.   Psychiatric/Behavioral:  Negative for agitation and decreased concentration.    Objective:     Vital Signs (Most Recent):  Temp: 98.5 °F (36.9 °C) (02/06/23 0706)  Pulse: 64 (02/06/23 0706)  Resp: 16 (02/06/23 0748)  BP: (!) 142/75 (02/06/23 0706)  SpO2: 100 % (02/06/23 0706)   Vital Signs (24h Range):  Temp:  [97.7 °F (36.5 °C)-99.1 °F (37.3 °C)] 98.5 °F (36.9 °C)  Pulse:  [64-89] 64  Resp:  [14-20] 16  SpO2:  [100 %] 100 %  BP: (117-148)/(66-88) 142/75     Weight: 101.6 kg (224 lb)  Body mass index is 34.06 kg/m².    Physical Exam  Vitals reviewed.   Cardiovascular:      Rate and Rhythm: Normal rate.      Pulses: Normal pulses.   Pulmonary:      Effort: Pulmonary effort is normal.   Abdominal:      General: Abdomen is flat. Bowel sounds are normal. There is no distension.      Tenderness: There is abdominal tenderness. There is no guarding.   Neurological:      Mental Status: She is alert.       Significant Labs:  I have reviewed all pertinent lab results within the past 24 hours.  CBC:   Recent Labs   Lab 02/06/23  0543   WBC 5.78   RBC 4.47   HGB 13.4   HCT 41.6      MCV 93   MCH 30.0   MCHC 32.2     CMP:   Recent Labs   Lab 02/06/23  0543   GLU 84   CALCIUM 9.0   ALBUMIN 3.9   PROT 7.1      K 3.5   CO2 23      BUN 8   CREATININE 0.9   ALKPHOS 47*   ALT 18   AST 16   BILITOT 0.9       Significant Diagnostics:  US with findings suggestive of cholecystitis        Assessment/Plan:     Cholecystitis, unspecified  TO OR for lap rosa      VTE Risk Mitigation (From admission, onward)         Ordered     IP VTE HIGH RISK PATIENT  Once         02/05/23 1902     Place sequential compression device  Until discontinued         02/05/23 1902                Thank you for your  consult. I will follow-up with patient. Please contact us if you have any additional questions.    Nathan Burger MD  General Surgery  [facility]

## 2023-02-06 NOTE — ANESTHESIA PROCEDURE NOTES
Arterial    Diagnosis: Intraop monitoring    Patient location during procedure: done in OR  Procedure start time: 2/6/2023 10:40 AM  Timeout: 2/6/2023 10:40 AM  Procedure end time: 2/6/2023 10:50 AM    Staffing  Authorizing Provider: Alton Villafuerte MD  Performing Provider: Alton Villafuerte MD    Anesthesiologist was present at the time of the procedure.    Preanesthetic Checklist  Completed: patient identified, risks and benefits discussed, monitors and equipment checked, timeout performed and anesthesia consent givenArterial  Skin Prep: chlorhexidine gluconate  Local Infiltration: lidocaine  Orientation: left  Location: radial    Catheter Size: 20 G  Catheter placement by Anatomical landmarks. Heme positive aspiration all ports. Insertion Attempts: 1  Assessment  Dressing: sutured in place and taped and tegaderm  Patient: Tolerated well

## 2023-02-06 NOTE — ANESTHESIA PREPROCEDURE EVALUATION
02/06/2023  Rosita Thomas is a 22 y.o., female.      Patient Active Problem List   Diagnosis    Abdominal pain    Cholecystitis, unspecified       History reviewed. No pertinent surgical history.     Tobacco Use:  The patient  reports that she is a non-smoker but has been exposed to tobacco smoke. She does not have any smokeless tobacco history on file.     No results found for this or any previous visit.     Imaging Results          US Abdomen Limited (Final result)  Result time 02/05/23 15:34:55    Final result by Benji Aquino MD (02/05/23 15:34:55)                 Narrative:    HISTORY: epigastric, RUQ pain    FINDINGS: Comparison to the CT of 12/08/2015. The visualized pancreas has normal echotexture with no peripancreatic fluid collections. The liver is normal in size and echotexture, 16 cm in length, without focal lesions or intrahepatic biliary ductal dilatation.    The gallbladder contains multiple echogenic, shadowing mobile gallstones, with diffuse gallbladder wall thickening. The gallbladder wall contains a few echogenic foci with ringdown artifact. No pericholecystic fluid. The common duct measures 4 mm in diameter.    The right kidney measures 10.8 cm in length, with normal parenchymal echotexture, and no echogenic calculi or hydronephrosis. The visualized abdominal aorta, IVC and main portal vein are normal. There is no ascites.    IMPRESSION:  1. Cholelithiasis, with diffuse gallbladder wall thickening. Please correlate with any clinical suspicion of acute cholecystitis.  2. Findings suggestive of gallbladder cholesterolosis.    Electronically signed by:  Benji Aquino MD  2/5/2023 3:34 PM CST Workstation: [id number]                               Lab Results   Component Value Date    WBC 5.78 02/06/2023    HGB 13.4 02/06/2023    HCT 41.6 02/06/2023    MCV 93 02/06/2023      02/06/2023     BMP  Lab Results   Component Value Date     02/06/2023    K 3.5 02/06/2023     02/06/2023    CO2 23 02/06/2023    BUN 8 02/06/2023    CREATININE 0.9 02/06/2023    CALCIUM 9.0 02/06/2023    ANIONGAP 9 02/06/2023    GLU 84 02/06/2023    GLU 85 02/05/2023     UPT _ NEGATIVE 2/5/23  No results found for this or any previous visit.            Pre-op Assessment    I have reviewed the Patient Summary Reports.     I have reviewed the Nursing Notes. I have reviewed the NPO Status.   I have reviewed the Medications.     Review of Systems  Anesthesia Hx:  No problems with previous Anesthesia  Denies Family Hx of Anesthesia complications.   Denies Personal Hx of Anesthesia complications.   Social:  Non-Smoker    Hematology/Oncology:  Hematology Normal        Cardiovascular:  Cardiovascular Normal     Pulmonary:  Pulmonary Normal    Hepatic/GI:  Hepatic/GI Normal    Musculoskeletal:  Musculoskeletal Normal    Neurological:  Neurology Normal    Endocrine:  Endocrine Normal  Obesity / BMI > 30      Physical Exam  General: Well nourished, Cooperative, Alert and Oriented    Airway:  Mallampati: III / II  Mouth Opening: Normal  TM Distance: Normal  Tongue: Normal  Neck ROM: Normal ROM    Dental:  Intact    Chest/Lungs:  Clear to auscultation    Heart:  Rate: Normal  Rhythm: Regular Rhythm  Sounds: Normal    Abdomen:  Normal, Soft, Nontender        Anesthesia Plan  Type of Anesthesia, risks & benefits discussed:    Anesthesia Type: Gen ETT  Intra-op Monitoring Plan: Standard ASA Monitors  Post Op Pain Control Plan: multimodal analgesia and IV/PO Opioids PRN  Induction:  IV  Airway Plan: Video, Post-Induction  Informed Consent: Informed consent signed with the Patient and all parties understand the risks and agree with anesthesia plan.  All questions answered.   ASA Score: 2 Emergent  Anesthesia Plan Notes:   GETA  IV tylenol  Zofran Pepcid Decadron    Ready For Surgery From Anesthesia Perspective.      .

## 2023-02-06 NOTE — PLAN OF CARE
[facility]  Initial Discharge Assessment       Primary Care Provider: Emir Butcher MD    Admission Diagnosis: Calculus of gallbladder with acute cholecystitis without obstruction [K80.00]    Admission Date: 2/5/2023  Expected Discharge Date: 2/8/2023    Discharge Barriers Identified: None    Plan is for patient to discharge home with no discharge planning needs identified at this time.     Payor: BLUE CROSS BLUE SHIELD / Plan: [place]MO / Product Type: HMO /     Extended Emergency Contact Information  Primary Emergency Contact: Yanna Thomas  Mobile Phone: [phone]  Relation: Sister  Preferred language: English   needed? No  Secondary Emergency Contact: Branden Craven  Mobile Phone: [phone]  Relation: Significant other  Preferred language: English   needed? No    Discharge Plan A: Home  Discharge Plan B: Home      [id number] [place] - [address]  [address] [place] [address]  Phone: [phone] Fax: [phone]      Initial Assessment (most recent)       Adult Discharge Assessment - 02/06/23 1309          Discharge Assessment    Assessment Type Discharge Planning Assessment     Facility Arrived From: Home     Do you expect to return to your current living situation? Yes     Do you have help at home or someone to help you manage your care at home? Yes     Prior to hospitilization cognitive status: Alert/Oriented;No Deficits     Current cognitive status: No Deficits;Alert/Oriented     Equipment Currently Used at Home none     Readmission within 30 days? No     Patient currently being followed by outpatient case management? No     Do you currently have service(s) that help you manage your care at home? No     Do you have prescription coverage? Yes     Coverage [place]     Are you on dialysis? No     Do you take coumadin? No     Discharge Plan A Home     Discharge Plan B Home     DME Needed Upon Discharge  none     Discharge Barriers Identified  None

## 2023-02-06 NOTE — SUBJECTIVE & OBJECTIVE
No current facility-administered medications on file prior to encounter.     Current Outpatient Medications on File Prior to Encounter   Medication Sig    ibuprofen (ADVIL,MOTRIN) 100 MG tablet Take 100 mg by mouth every 6 (six) hours as needed for Temperature greater than.       Review of patient's allergies indicates:  No Known Allergies    History reviewed. No pertinent past medical history.  History reviewed. No pertinent surgical history.  Family History       Problem Relation (Age of Onset)    Stroke Maternal Grandfather          Tobacco Use    Smoking status: Passive Smoke Exposure - Never Smoker    Smokeless tobacco: Not on file    Tobacco comments:     step mother smokes outside only   Substance and Sexual Activity    Alcohol use: Not on file    Drug use: Not on file    Sexual activity: Not on file     Review of Systems   Constitutional:  Negative for activity change.   Gastrointestinal:  Positive for abdominal pain.   Musculoskeletal:  Negative for arthralgias.   Skin:  Negative for color change and wound.   Hematological:  Negative for adenopathy. Does not bruise/bleed easily.   Psychiatric/Behavioral:  Negative for agitation and decreased concentration.    Objective:     Vital Signs (Most Recent):  Temp: 98.5 °F (36.9 °C) (02/06/23 0706)  Pulse: 64 (02/06/23 0706)  Resp: 16 (02/06/23 0748)  BP: (!) 142/75 (02/06/23 0706)  SpO2: 100 % (02/06/23 0706)   Vital Signs (24h Range):  Temp:  [97.7 °F (36.5 °C)-99.1 °F (37.3 °C)] 98.5 °F (36.9 °C)  Pulse:  [64-89] 64  Resp:  [14-20] 16  SpO2:  [100 %] 100 %  BP: (117-148)/(66-88) 142/75     Weight: 101.6 kg (224 lb)  Body mass index is 34.06 kg/m².    Physical Exam  Vitals reviewed.   Cardiovascular:      Rate and Rhythm: Normal rate.      Pulses: Normal pulses.   Pulmonary:      Effort: Pulmonary effort is normal.   Abdominal:      General: Abdomen is flat. Bowel sounds are normal. There is no distension.      Tenderness: There is abdominal tenderness. There is  no guarding.   Neurological:      Mental Status: She is alert.       Significant Labs:  I have reviewed all pertinent lab results within the past 24 hours.  CBC:   Recent Labs   Lab 02/06/23  0543   WBC 5.78   RBC 4.47   HGB 13.4   HCT 41.6      MCV 93   MCH 30.0   MCHC 32.2     CMP:   Recent Labs   Lab 02/06/23  0543   GLU 84   CALCIUM 9.0   ALBUMIN 3.9   PROT 7.1      K 3.5   CO2 23      BUN 8   CREATININE 0.9   ALKPHOS 47*   ALT 18   AST 16   BILITOT 0.9       Significant Diagnostics:  US with findings suggestive of cholecystitis

## 2023-02-06 NOTE — ANESTHESIA PROCEDURE NOTES
Intubation    Date/Time: 2/6/2023 10:41 AM  Performed by: Ivy Pina CRNA  Authorized by: Alton Villafuerte MD     Intubation:     Induction:  Intravenous    Intubated:  Postinduction    Mask Ventilation:  Easy mask    Attempts:  1    Attempted By:  NIKKI    Blade:  Rendon 3    Laryngeal View Grade: Grade I - full view of cords      Difficult Airway Encountered?: No      Complications:  None    Airway Device Size:  7.5    Style/Cuff Inflation:  Cuffed    Tube secured:  22    Secured at:  The lips    Placement Verified By:  Capnometry    Complicating Factors:  None

## 2023-02-06 NOTE — PLAN OF CARE
Plan of care reviewed with patient and 2 family members.  Questions answered.  Patient and family verbalize understanding of plan of care.

## 2023-02-06 NOTE — ANESTHESIA POSTPROCEDURE EVALUATION
Anesthesia Post Evaluation    Patient: Rosita Thomas    Procedure(s) Performed: Procedure(s) (LRB):  CHOLECYSTECTOMY, LAPAROSCOPIC (N/A)    Final Anesthesia Type: general      Patient location during evaluation: PACU  Patient participation: Yes- Able to Participate  Level of consciousness: awake and alert  Post-procedure vital signs: reviewed and stable  Pain management: adequate  Airway patency: patent    PONV status at discharge: No PONV  Anesthetic complications: no      Cardiovascular status: stable  Respiratory status: unassisted and spontaneous ventilation  Hydration status: euvolemic  Follow-up not needed.          Vitals Value Taken Time   /68 02/06/23 1300   Temp 36.3 °C (97.4 °F) 02/06/23 1145   Pulse 68 02/06/23 1300   Resp 13 02/06/23 1300   SpO2 100 % 02/06/23 1300   Vitals shown include unvalidated device data.      No case tracking events are documented in the log.      Pain/Nancy Score: Pain Rating Prior to Med Admin: 7 (2/6/2023 12:35 PM)  Pain Rating Post Med Admin: 0 (2/5/2023  6:22 PM)  Nancy Score: 10 (2/6/2023 12:30 PM)

## 2023-02-06 NOTE — HPI
02/5/2023 ER Note Rosita Thomas is a 22 year old female with no pmh presenting to the ED with c/o epigastric and RUQ abdominal pain. She states that she began having pain approximately 2 weeks ago that was unchanged with eating. She began having worsening pain over the past two days. She denies fever, vomiting, diarrhea, urinary symptoms. She has taken no medication for pain.     02/05/2023 Patient seen today in the ER. She presented with epigastric pain that she states began approximately 2 weeks ago. She states that when she thought about it she had been having the same pain since this past Thanksgiving and notes that it just has been getting worse. She reports calling her Grandmother and she thought it was her gallbladder because symptoms were similar to her mom, her aunt, and grandmother when they had the same problem. Denies any sick contact. She denies any fever or chills.

## 2023-02-06 NOTE — HPI
This is a pleasant 22-year-old female who presented to the hospital overnight with abdominal pain in the epigastric and right upper quadrant.  She notes the pain and present proximally 24 hours.  She denied nausea or vomiting.  Reports no fevers or chills.  In the ER she had workup consisting of labs and ultrasound with findings consistent with acute cholecystitis.  She was admitted and started on antibiotics kept NPO.  Patient with no significant medical or surgical history.

## 2023-02-06 NOTE — H&P
[facility] - Emergency Dept  Hospital Medicine  History & Physical    Patient Name: Rosita Thomas  MRN: [id number]  Patient Class: Emergency  Admission Date: 2/5/2023  Attending Physician: Tulio Diaz MD  Primary Care Provider: Emir Butcher MD         Patient information was obtained from patient and ER records.     Subjective:     Principal Problem:<principal problem not specified>    Chief Complaint:   Chief Complaint   Patient presents with    Abdominal Pain     RUQ since yest        HPI: 02/5/2023 ER Note Rosita Thomas is a 22 year old female with no pmh presenting to the ED with c/o epigastric and RUQ abdominal pain. She states that she began having pain approximately 2 weeks ago that was unchanged with eating. She began having worsening pain over the past two days. She denies fever, vomiting, diarrhea, urinary symptoms. She has taken no medication for pain.     02/05/2023 Patient seen today in the ER. She presented with epigastric pain that she states began approximately 2 weeks ago. She states that when she thought about it she had been having the same pain since this past Thanksgiving and notes that it just has been getting worse. She reports calling her Grandmother and she thought it was her gallbladder because symptoms were similar to her mom, her aunt, and grandmother when they had the same problem. Denies any sick contact. She denies any fever or chills.       No past medical history on file.    No past surgical history on file.    Review of patient's allergies indicates:  No Known Allergies    No current facility-administered medications on file prior to encounter.     Current Outpatient Medications on File Prior to Encounter   Medication Sig    ibuprofen (ADVIL,MOTRIN) 100 MG tablet Take 100 mg by mouth every 6 (six) hours as needed for Temperature greater than.    [DISCONTINUED] nitrofurantoin, macrocrystal-monohydrate, (MACROBID) 100 MG capsule Take 100 mg by mouth 2 (two)  times daily.     Family History       Problem Relation (Age of Onset)    Stroke Maternal Grandfather          Tobacco Use    Smoking status: Passive Smoke Exposure - Never Smoker    Smokeless tobacco: Not on file    Tobacco comments:     step mother smokes outside only   Substance and Sexual Activity    Alcohol use: Not on file    Drug use: Not on file    Sexual activity: Not on file     Review of Systems   Constitutional:  Positive for activity change and appetite change. Negative for chills, diaphoresis and fatigue.   HENT:  Negative for congestion, ear pain, sinus pain and voice change.    Respiratory:  Negative for cough, choking, chest tightness and shortness of breath.    Cardiovascular:  Negative for chest pain, palpitations and leg swelling.   Gastrointestinal:  Positive for abdominal pain, nausea and vomiting. Negative for abdominal distention and blood in stool.   Endocrine: Negative for cold intolerance and heat intolerance.   Genitourinary:  Negative for dysuria, enuresis, flank pain and hematuria.   Musculoskeletal:  Negative for arthralgias, joint swelling and neck stiffness.   Skin:  Negative for color change, pallor and rash.   Neurological:  Negative for dizziness, facial asymmetry, light-headedness and headaches.   Hematological:  Negative for adenopathy. Does not bruise/bleed easily.   Psychiatric/Behavioral:  Negative for agitation, behavioral problems and confusion.    All other systems reviewed and are negative.  Objective:     Vital Signs (Most Recent):  Temp: 99.1 °F (37.3 °C) (02/05/23 1348)  Pulse: 77 (02/05/23 1822)  Resp: 20 (02/05/23 1822)  BP: 137/66 (02/05/23 1822)  SpO2: 100 % (02/05/23 1721) Vital Signs (24h Range):  Temp:  [99.1 °F (37.3 °C)] 99.1 °F (37.3 °C)  Pulse:  [77-89] 77  Resp:  [14-20] 20  SpO2:  [100 %] 100 %  BP: (137-148)/(66-88) 137/66     Weight: 101.6 kg (224 lb)  Body mass index is 34.06 kg/m².    Physical Exam  Vitals and nursing note reviewed.    Constitutional:       Appearance: She is obese.   HENT:      Head: Normocephalic and atraumatic.      Right Ear: Tympanic membrane, ear canal and external ear normal.      Left Ear: Tympanic membrane, ear canal and external ear normal.      Nose: Nose normal.      Mouth/Throat:      Mouth: Mucous membranes are dry.      Pharynx: Oropharynx is clear.   Eyes:      Extraocular Movements: Extraocular movements intact.      Conjunctiva/sclera: Conjunctivae normal.      Pupils: Pupils are equal, round, and reactive to light.   Cardiovascular:      Rate and Rhythm: Normal rate and regular rhythm.      Pulses: Normal pulses.      Heart sounds: Normal heart sounds.   Pulmonary:      Effort: Pulmonary effort is normal.      Breath sounds: Normal breath sounds.   Abdominal:      General: Abdomen is flat. Bowel sounds are normal.      Palpations: Abdomen is soft.      Tenderness: There is abdominal tenderness. There is guarding.   Genitourinary:     Rectum: Normal.   Musculoskeletal:         General: Normal range of motion.      Cervical back: Normal range of motion and neck supple.   Skin:     General: Skin is warm and dry.      Capillary Refill: Capillary refill takes less than 2 seconds.   Neurological:      General: No focal deficit present.      Mental Status: She is alert and oriented to person, place, and time.   Psychiatric:         Mood and Affect: Mood normal.         Behavior: Behavior normal.         Thought Content: Thought content normal.         Judgment: Judgment normal.         CRANIAL NERVES     CN III, IV, VI   Pupils are equal, round, and reactive to light.     Significant Labs: All pertinent labs within the past 24 hours have been reviewed.  Recent Lab Results         02/05/23  1556   02/05/23  1350        Albumin 4.7         Alkaline Phosphatase 55         ALT 19         Anion Gap 9         Appearance, UA   Clear       AST 21         Baso # 0.03         Basophil % 0.4         Bilirubin (UA)   Negative        BILIRUBIN TOTAL 0.7  Comment: For infants and newborns, interpretation of results should be based  on gestational age, weight and in agreement with clinical  observations.    Premature Infant recommended reference ranges:  Up to 24 hours.............<8.0 mg/dL  Up to 48 hours............<12.0 mg/dL  3-5 days..................<15.0 mg/dL  6-29 days.................<15.0 mg/dL           BUN 9         Calcium 9.5         Chloride 105         CO2 25         Color, UA   Yellow       Creatinine 0.9         Differential Method Automated         eGFR >60.0         Eos # 0.1         Eosinophil % 0.8         Glucose 85         Glucose, UA   Negative       Gran # (ANC) 4.9         Gran % 66.1         Hematocrit 44.9         Hemoglobin 14.9         Immature Grans (Abs) 0.01  Comment: Mild elevation in immature granulocytes is non specific and   can be seen in a variety of conditions including stress response,   acute inflammation, trauma and pregnancy. Correlation with other   laboratory and clinical findings is essential.           Immature Granulocytes 0.1         Ketones, UA   Negative       Leukocytes, UA   Negative       Lipase 32         Lymph # 1.9         Lymph % 26.0         MCH 29.7         MCHC 33.2         MCV 90         Mono # 0.5         Mono % 6.6         MPV 11.5         NITRITE UA   Negative       nRBC 0         Occult Blood UA   Negative       pH, UA   7.0       Platelets 338         Potassium 3.6         Preg Test, Ur   Negative       PROTEIN TOTAL 8.6         Protein, UA   Negative  Comment: Recommend a 24 hour urine protein or a urine   protein/creatinine ratio if globulin induced proteinuria is  clinically suspected.          Acceptable   Yes       RBC 5.01         RDW 12.6         Sodium 139         Specific Weld, UA   1.010       Specimen UA   Urine, Clean Catch       UROBILINOGEN UA   Negative       WBC 7.42                 Significant Imaging: I have reviewed all pertinent imaging  results/findings within the past 24 hours.    Assessment/Plan:     Cholecystitis, unspecified  NPO   Surgical consult  Pain management  IV antibiotics        Abdominal pain  .NPO  Surgical consult  IV fluids  Pain management          VTE Risk Mitigation (From admission, onward)    None             BARON Levin  Department of Hospital Medicine   [facility] - Emergency Dept

## 2023-02-06 NOTE — TRANSFER OF CARE
"Anesthesia Transfer of Care Note    Patient: Rosita Thomas    Procedure(s) Performed: Procedure(s) (LRB):  CHOLECYSTECTOMY, LAPAROSCOPIC (N/A)    Patient location: PACU    Anesthesia Type: general    Transport from OR: Transported from OR on room air with adequate spontaneous ventilation    Post pain: adequate analgesia    Post assessment: no apparent anesthetic complications    Post vital signs: stable    Level of consciousness: awake and alert    Nausea/Vomiting: no nausea/vomiting    Complications: none    Transfer of care protocol was followed      Last vitals:   Visit Vitals  BP (!) 142/75   Pulse 64   Temp 36.9 °C (98.5 °F)   Resp 16   Ht 5' 8" (1.727 m)   Wt 101.6 kg (224 lb)   LMP 01/21/2023 (Exact Date)   SpO2 100%   Breastfeeding No   BMI 34.06 kg/m²     "

## 2023-02-06 NOTE — OP NOTE
Date of surgery:  2/6/23    Staff surgeon:  Dr. Nathan Burger    Preoperative diagnosis:  Acute cholecystitis    Postoperative diagnosis:  The same    Procedure:  Laparoscopic cholecystectomy    Anesthesia:  General endotracheal anesthesia    Indication for procedure:  Pleasant 22-year-old female presented to the Emergency room with signs symptoms suggestive of cholecystitis.  She is scheduled for laparoscopic cholecystectomy the above-mentioned date.    Description of procedure:  Following signing informed consent patient in the operating room placed supine position.  General endotracheal anesthesia was administered without event.  The abdomen is prepped and draped in standard fashion and appropriate time-out procedure was then performed.  Next a  small transverse incision above the umbilicus is made and carried down to  the deep dermal tissue.  The abdominal cavity is entered with a  Veress needle and pneumoperitoneum to 15 mmHg is established.  Next the abdomen is entered with an Optiview trocar under direct visualization.  Patient placed in reverse Trendelenburg and tilted towards the left side.  A 5 mm is placed in the epigastric trocar and 2 in the right subcostal margin.  All trocars were placed under direct visualization and after injection of quarter percent Marcaine with epi.  The fundus was grasped and held over the patient's liver.  This exposed the infundibulum which was grasped and held towards the patient's feet.  Next the triangle of Calot is dissected identifying the cystic duct and cystic artery in their usual anatomic locations. Two clips are placed distally on the cystic duct 1 clip proximally.  Two clips are placed on the cystic artery. The duct and artery are cut between clips.  Next cautery is used to remove the gallbladder from the hepatic fossa.  The gallbladder is then removed from the abdominal cavity with assistance of an Endo-Catch bag.  Having removed the gallbladder the hepatic fossa  is evaluated  once again to ensure adequate hemostasis.  Next all trocars are removed under direct visualization.  I closed the umbilical fascia with 0 Vicryl suture.  Skin incision was closed with 4-0 Monocryl.  Patient is extubated taken recovery room stable condition.  Blood loss was 10 cc's

## 2023-02-07 VITALS
OXYGEN SATURATION: 96 % | WEIGHT: 224 LBS | SYSTOLIC BLOOD PRESSURE: 142 MMHG | HEIGHT: 68 IN | HEART RATE: 97 BPM | BODY MASS INDEX: 33.95 KG/M2 | RESPIRATION RATE: 16 BRPM | DIASTOLIC BLOOD PRESSURE: 88 MMHG | TEMPERATURE: 98 F

## 2023-02-07 PROBLEM — K80.00 CALCULUS OF GALLBLADDER WITH ACUTE CHOLECYSTITIS WITHOUT OBSTRUCTION: Status: ACTIVE | Noted: 2023-02-07

## 2023-02-07 LAB
ALBUMIN SERPL BCP-MCNC: 3.8 G/DL (ref 3.5–5.2)
ALP SERPL-CCNC: 47 U/L (ref 55–135)
ALT SERPL W/O P-5'-P-CCNC: 54 U/L (ref 10–44)
ANION GAP SERPL CALC-SCNC: 7 MMOL/L (ref 8–16)
AST SERPL-CCNC: 57 U/L (ref 10–40)
BASOPHILS # BLD AUTO: 0.03 K/UL (ref 0–0.2)
BASOPHILS NFR BLD: 0.2 % (ref 0–1.9)
BILIRUB SERPL-MCNC: 0.8 MG/DL (ref 0.1–1)
BUN SERPL-MCNC: 10 MG/DL (ref 6–20)
CALCIUM SERPL-MCNC: 9.3 MG/DL (ref 8.7–10.5)
CHLORIDE SERPL-SCNC: 107 MMOL/L (ref 95–110)
CO2 SERPL-SCNC: 25 MMOL/L (ref 23–29)
CREAT SERPL-MCNC: 0.9 MG/DL (ref 0.5–1.4)
DIFFERENTIAL METHOD: ABNORMAL
EOSINOPHIL # BLD AUTO: 0 K/UL (ref 0–0.5)
EOSINOPHIL NFR BLD: 0.1 % (ref 0–8)
ERYTHROCYTE [DISTWIDTH] IN BLOOD BY AUTOMATED COUNT: 12.6 % (ref 11.5–14.5)
EST. GFR  (NO RACE VARIABLE): >60 ML/MIN/1.73 M^2
GLUCOSE SERPL-MCNC: 111 MG/DL (ref 70–110)
HCT VFR BLD AUTO: 38.8 % (ref 37–48.5)
HGB BLD-MCNC: 12.5 G/DL (ref 12–16)
IMM GRANULOCYTES # BLD AUTO: 0.05 K/UL (ref 0–0.04)
IMM GRANULOCYTES NFR BLD AUTO: 0.4 % (ref 0–0.5)
LYMPHOCYTES # BLD AUTO: 1.4 K/UL (ref 1–4.8)
LYMPHOCYTES NFR BLD: 9.9 % (ref 18–48)
MCH RBC QN AUTO: 29.5 PG (ref 27–31)
MCHC RBC AUTO-ENTMCNC: 32.2 G/DL (ref 32–36)
MCV RBC AUTO: 92 FL (ref 82–98)
MONOCYTES # BLD AUTO: 0.9 K/UL (ref 0.3–1)
MONOCYTES NFR BLD: 6.5 % (ref 4–15)
NEUTROPHILS # BLD AUTO: 11.7 K/UL (ref 1.8–7.7)
NEUTROPHILS NFR BLD: 82.9 % (ref 38–73)
NRBC BLD-RTO: 0 /100 WBC
PLATELET # BLD AUTO: 284 K/UL (ref 150–450)
PMV BLD AUTO: 12 FL (ref 9.2–12.9)
POTASSIUM SERPL-SCNC: 3.6 MMOL/L (ref 3.5–5.1)
PROT SERPL-MCNC: 7.2 G/DL (ref 6–8.4)
RBC # BLD AUTO: 4.24 M/UL (ref 4–5.4)
SODIUM SERPL-SCNC: 139 MMOL/L (ref 136–145)
WBC # BLD AUTO: 14.1 K/UL (ref 3.9–12.7)

## 2023-02-07 PROCEDURE — 93005 ELECTROCARDIOGRAM TRACING: CPT | Performed by: SPECIALIST

## 2023-02-07 PROCEDURE — G0378 HOSPITAL OBSERVATION PER HR: HCPCS

## 2023-02-07 PROCEDURE — 96366 THER/PROPH/DIAG IV INF ADDON: CPT

## 2023-02-07 PROCEDURE — 85025 COMPLETE CBC W/AUTO DIFF WBC: CPT | Performed by: SURGERY

## 2023-02-07 PROCEDURE — 63600175 PHARM REV CODE 636 W HCPCS: Performed by: SURGERY

## 2023-02-07 PROCEDURE — 96375 TX/PRO/DX INJ NEW DRUG ADDON: CPT

## 2023-02-07 PROCEDURE — 80053 COMPREHEN METABOLIC PANEL: CPT | Performed by: SURGERY

## 2023-02-07 PROCEDURE — 25000003 PHARM REV CODE 250: Performed by: INTERNAL MEDICINE

## 2023-02-07 PROCEDURE — 25000003 PHARM REV CODE 250: Performed by: SURGERY

## 2023-02-07 PROCEDURE — 93010 EKG 12-LEAD: ICD-10-PCS | Mod: ,,, | Performed by: SPECIALIST

## 2023-02-07 PROCEDURE — 96361 HYDRATE IV INFUSION ADD-ON: CPT

## 2023-02-07 PROCEDURE — 36415 COLL VENOUS BLD VENIPUNCTURE: CPT | Performed by: SURGERY

## 2023-02-07 PROCEDURE — 63600175 PHARM REV CODE 636 W HCPCS: Performed by: INTERNAL MEDICINE

## 2023-02-07 PROCEDURE — 93010 ELECTROCARDIOGRAM REPORT: CPT | Mod: ,,, | Performed by: SPECIALIST

## 2023-02-07 PROCEDURE — 96376 TX/PRO/DX INJ SAME DRUG ADON: CPT

## 2023-02-07 RX ORDER — MAG HYDROX/ALUMINUM HYD/SIMETH 200-200-20
30 SUSPENSION, ORAL (FINAL DOSE FORM) ORAL
Status: DISCONTINUED | OUTPATIENT
Start: 2023-02-07 | End: 2023-02-07 | Stop reason: HOSPADM

## 2023-02-07 RX ORDER — AMOXICILLIN AND CLAVULANATE POTASSIUM 875; 125 MG/1; MG/1
1 TABLET, FILM COATED ORAL EVERY 12 HOURS
Qty: 14 TABLET | Refills: 0 | Status: SHIPPED | OUTPATIENT
Start: 2023-02-07

## 2023-02-07 RX ORDER — MORPHINE SULFATE 4 MG/ML
4 INJECTION, SOLUTION INTRAMUSCULAR; INTRAVENOUS
Status: DISCONTINUED | OUTPATIENT
Start: 2023-02-07 | End: 2023-02-07 | Stop reason: HOSPADM

## 2023-02-07 RX ORDER — OXYCODONE HYDROCHLORIDE 5 MG/1
5 TABLET ORAL EVERY 4 HOURS PRN
Refills: 0
Start: 2023-02-07

## 2023-02-07 RX ORDER — ONDANSETRON 4 MG/1
4 TABLET, ORALLY DISINTEGRATING ORAL EVERY 6 HOURS PRN
Qty: 30 TABLET | Refills: 0 | Status: SHIPPED | OUTPATIENT
Start: 2023-02-07

## 2023-02-07 RX ADMIN — ALUMINUM HYDROXIDE, MAGNESIUM HYDROXIDE, AND SIMETHICONE 30 ML: 200; 200; 20 SUSPENSION ORAL at 10:02

## 2023-02-07 RX ADMIN — MORPHINE SULFATE 4 MG: 4 INJECTION, SOLUTION INTRAMUSCULAR; INTRAVENOUS at 04:02

## 2023-02-07 RX ADMIN — ALUMINUM HYDROXIDE, MAGNESIUM HYDROXIDE, AND SIMETHICONE 30 ML: 200; 200; 20 SUSPENSION ORAL at 03:02

## 2023-02-07 RX ADMIN — OXYCODONE AND ACETAMINOPHEN 1 TABLET: 325; 5 TABLET ORAL at 03:02

## 2023-02-07 RX ADMIN — ALUMINUM HYDROXIDE, MAGNESIUM HYDROXIDE, AND SIMETHICONE 30 ML: 200; 200; 20 SUSPENSION ORAL at 04:02

## 2023-02-07 RX ADMIN — FAMOTIDINE 20 MG: 10 INJECTION INTRAVENOUS at 09:02

## 2023-02-07 RX ADMIN — PIPERACILLIN SODIUM AND TAZOBACTAM SODIUM 3.38 G: 3; .375 INJECTION, POWDER, LYOPHILIZED, FOR SOLUTION INTRAVENOUS at 10:02

## 2023-02-07 RX ADMIN — OXYCODONE AND ACETAMINOPHEN 1 TABLET: 325; 5 TABLET ORAL at 09:02

## 2023-02-07 RX ADMIN — OXYCODONE AND ACETAMINOPHEN 1 TABLET: 325; 5 TABLET ORAL at 04:02

## 2023-02-07 RX ADMIN — POLYETHYLENE GLYCOL 3350 17 G: 17 POWDER, FOR SOLUTION ORAL at 09:02

## 2023-02-07 RX ADMIN — PIPERACILLIN SODIUM AND TAZOBACTAM SODIUM 3.38 G: 3; .375 INJECTION, POWDER, LYOPHILIZED, FOR SOLUTION INTRAVENOUS at 03:02

## 2023-02-07 RX ADMIN — SODIUM CHLORIDE, SODIUM LACTATE, POTASSIUM CHLORIDE, AND CALCIUM CHLORIDE: .6; .31; .03; .02 INJECTION, SOLUTION INTRAVENOUS at 09:02

## 2023-02-07 RX ADMIN — LORAZEPAM 0.5 MG: 2 INJECTION INTRAMUSCULAR; INTRAVENOUS at 03:02

## 2023-02-07 NOTE — PROGRESS NOTES
[facility] MEDICINE PROGRESS NOTE  DOS: 02/06/2023    INTERVAL HISTORY:  2/6: Patient seen and examined in PACU.  Persistent right-sided abdominal pain, constant timing mild intensity, improved with pain medicines.  Patient reported some shortness of breath associated with chest tightness and anxiety.  No nausea or vomiting.  Patient is still drowsy from anesthesia.  No fever or chills.  Nursing at bedside.    ROS:  3 point review of systems reviewed and negative except as per above      Vitals:    02/06/23 1245 02/06/23 1328 02/06/23 1401 02/06/23 1700   BP:  125/78  (!) 111/59   BP Location:  Left arm  Left arm   Patient Position:  Lying  Lying   Pulse: 65 67  68   Resp: 12 16 16 16   Temp:  98.6 °F (37 °C)  98.1 °F (36.7 °C)   TempSrc:  Oral  Oral   SpO2: 96% 98%  97%   Weight:       Height:         Constitutional:       Appearance:  Mildly drowsy but arousable, nontoxic nondiaphoretic, comfortable  HENT:    Dry mucous membranes  Eyes:      Extraocular Movements: Extraocular movements intact.      Conjunctiva/sclera: Conjunctivae normal.  No discharge     Pupils: Pupils are equal, round, and reactive to light.   Cardiovascular:      Rate and Rhythm: Normal rate and regular rhythm.      Pulses: Normal pulses.      Heart sounds: Normal heart sounds.   Pulmonary:      Effort: Pulmonary effort is normal.      Breath sounds: Normal breath sounds.   Abdominal:    abdomen is soft and mildly distended, minimal tenderness to palpation without guarding or rebound  Postop sites intact without bleeding or drainage  Skin:     General: Skin is warm and dry.  No jaundice   Neurological:      General: No focal deficit present.  Mildly drowsy but arousable     Mental Status: She is alert and oriented to person, place, and time.       Follows command appropriately, fluent speech  Psychiatric:   Mood is calm, insight fair       LABS:  Sodium 140   Potassium 3.5   Creatinine 0.9    Assessment/Plan:     Acute cholecystitis status  post laparoscopic cholecystectomy    Appreciate surgery.   Continue postoperative supportive care including pain control and antiemetics as needed   Will add IV Ativan for anxiety control   Continue IV antibiotics, transition to oral next 24 hours   Continue IV fluids and wean as diet improves   Serial labs   Continue IV Pepcid   Mobilize as able   Possible discharge next 24 hours  High Risk secondary to acute illness requiring surgical intervention; IV narcotics for pain and symptom control        VTE Risk Mitigation (From admission, onward)           Ordered     IP VTE HIGH RISK PATIENT  Once         02/05/23 1902     Place sequential compression device  Until discontinued         02/05/23 1902                       Sander Anthony MD  Department of Hospital Medicine   [facility]

## 2023-02-07 NOTE — PROGRESS NOTES
Chart reviewed.  Vitals and labs reviewed    Wt Readings from Last 3 Encounters:   02/05/23 101.6 kg (224 lb)   12/08/15 99.4 kg (219 lb 2.2 oz) (>99 %, Z= 2.41)*   12/08/15 99.3 kg (218 lb 14.7 oz) (>99 %, Z= 2.41)*     * Growth percentiles are based on [facility] (Girls, 2-20 Years) data.     Temp Readings from Last 3 Encounters:   02/07/23 98.5 °F (36.9 °C) (Oral)   12/08/15 98.2 °F (36.8 °C) (Oral)   12/08/15 98.4 °F (36.9 °C)     BP Readings from Last 3 Encounters:   02/07/23 133/76   12/08/15 132/75 (98 %, Z = 2.05 /  83 %, Z = 0.95)*   12/08/15 (!) 153/65 (>99 %, Z >2.33 /  46 %, Z = -0.10)*     *BP percentiles are based on the 2017 AAP Clinical Practice Guideline for girls     Pulse Readings from Last 3 Encounters:   02/07/23 79   12/08/15 105   12/08/15 99     Lab Results   Component Value Date    WBC 14.10 (H) 02/07/2023    HGB 12.5 02/07/2023    HCT 38.8 02/07/2023    MCV 92 02/07/2023     02/07/2023       CMP  Sodium   Date Value Ref Range Status   02/07/2023 139 136 - 145 mmol/L Final     Potassium   Date Value Ref Range Status   02/07/2023 3.6 3.5 - 5.1 mmol/L Final     Chloride   Date Value Ref Range Status   02/07/2023 107 95 - 110 mmol/L Final     CO2   Date Value Ref Range Status   02/07/2023 25 23 - 29 mmol/L Final     Glucose   Date Value Ref Range Status   02/07/2023 111 (H) 70 - 110 mg/dL Final     BUN   Date Value Ref Range Status   02/07/2023 10 6 - 20 mg/dL Final     Creatinine   Date Value Ref Range Status   02/07/2023 0.9 0.5 - 1.4 mg/dL Final     Calcium   Date Value Ref Range Status   02/07/2023 9.3 8.7 - 10.5 mg/dL Final     Total Protein   Date Value Ref Range Status   02/07/2023 7.2 6.0 - 8.4 g/dL Final     Albumin   Date Value Ref Range Status   02/07/2023 3.8 3.5 - 5.2 g/dL Final     Total Bilirubin   Date Value Ref Range Status   02/07/2023 0.8 0.1 - 1.0 mg/dL Final     Comment:     For infants and newborns, interpretation of results should be based  on gestational age, weight and  in agreement with clinical  observations.    Premature Infant recommended reference ranges:  Up to 24 hours.............<8.0 mg/dL  Up to 48 hours............<12.0 mg/dL  3-5 days..................<15.0 mg/dL  6-29 days.................<15.0 mg/dL       Alkaline Phosphatase   Date Value Ref Range Status   02/07/2023 47 (L) 55 - 135 U/L Final     AST   Date Value Ref Range Status   02/07/2023 57 (H) 10 - 40 U/L Final     ALT   Date Value Ref Range Status   02/07/2023 54 (H) 10 - 44 U/L Final     Anion Gap   Date Value Ref Range Status   02/07/2023 7 (L) 8 - 16 mmol/L Final     eGFR   Date Value Ref Range Status   02/07/2023 >60.0 >60 mL/min/1.73 m^2 Final     Slight increase in AST/ALT.  No unexpected. If pain controlled. This am and tolerating diet ok for d/c today

## 2023-02-07 NOTE — DISCHARGE SUMMARY
[facility]  Discharge Summary      Patient Name: Rosita Thomas  MRN: [id number]  DIEGO: [id number]  Patient Class: OP- Observation  Admission Date: 2/5/2023  Hospital Length of Stay: 1 days  Discharge Date and Time:  02/07/2023 5:51 PM  Attending Physician: Sander Anthony MD   Discharging Provider: Sander Anthony MD  Primary Care Provider: Emir Butcher MD    Primary Care Team: Networked reference to record PCT     HPI:   02/5/2023 ER Note Rosita Thomas is a 22 year old female with no pmh presenting to the ED with c/o epigastric and RUQ abdominal pain. She states that she began having pain approximately 2 weeks ago that was unchanged with eating. She began having worsening pain over the past two days. She denies fever, vomiting, diarrhea, urinary symptoms. She has taken no medication for pain.     02/05/2023 Patient seen today in the ER. She presented with epigastric pain that she states began approximately 2 weeks ago. She states that when she thought about it she had been having the same pain since this past Thanksgiving and notes that it just has been getting worse. She reports calling her Grandmother and she thought it was her gallbladder because symptoms were similar to her mom, her aunt, and grandmother when they had the same problem. Denies any sick contact. She denies any fever or chills.       Procedure(s) (LRB):  CHOLECYSTECTOMY, LAPAROSCOPIC (N/A)      Hospital Course:   The patient was admitted to the hospital for workup and treatment including empiric IV antibiotics, pain control, and antiemetics.  General surgery consultation was obtained.  The patient had bowel rest with IV fluids.  Subsequently the patient underwent laparoscopic cholecystectomy.  The patient had some chest tightness and shortness of breath in the postoperative course that was deemed to be secondary to anxiety.  Chest x-ray unremarkable.  Patient improved with medical treatment.  Patient is  tolerating diet without problems today.  Patient is medically stable for discharge home with outpatient follow-up.  She will be prescribed a short course of antibiotics was medications for pain and nausea as needed.  Patient is in understanding and agreement with discharge planning today.      Discharge physical exam:   Constitutional:       Appears comfortable, nontoxic nondiaphoretic, alert and oriented, in good spirits  Cardiovascular:      Rate and Rhythm: Normal rate and regular rhythm.      Pulses: Normal pulses.      Heart sounds: Normal heart sounds.   Pulmonary:      Effort: Pulmonary effort is normal.      Breath sounds: Normal breath sounds.   Abdominal:    abdomen is soft and mildly distended, minimal tenderness to palpation without guarding or rebound  Postop sites intact without bleeding or drainage    Goals of Care Treatment Preferences:  Code Status: Full Code      Consults:   Consults (From admission, onward)          Status Ordering Provider     Inpatient consult to General surgery  Once        Provider:  Nathan Burger MD    Completed EUFEMIA SNOWDEN     Inpatient consult to Hospitalist  Once        Provider:  BARON Blood JOSHUA D.          Discharge diagnoses:  Acute cholecystitis due to cholelithiasis status post laparoscopic cholecystectomy  Anxiety      Final Active Diagnoses:    Diagnosis Date Noted POA    PRINCIPAL PROBLEM:  Calculus of gallbladder with acute cholecystitis without obstruction [K80.00] 02/07/2023 Yes      Problems Resolved During this Admission:       Discharged Condition: stable    Disposition: Home or Self Care    Follow Up:   Follow-up Information       Nathan Burger MD Follow up in 1 week(s).    Specialties: General Surgery, Colon and Rectal Surgery, Surgery  Contact information:  [address] [facility]  [address] [place] [address] [id number] [phone]                           Patient Instructions:      Diet Adult Regular   Order Comments:  Soft/bland diet for 3-4 days and slowly advance to regular as tolerated     Other restrictions (specify):   Order Comments: No lifting greater than 5 lb for 1 week, no strenuous activity for 1 week until seen at follow-up by General surgery     Notify your health care provider if you experience any of the following:  temperature >100.4     Notify your health care provider if you experience any of the following:  persistent nausea and vomiting or diarrhea     Notify your health care provider if you experience any of the following:  severe uncontrolled pain     Notify your health care provider if you experience any of the following:  redness, tenderness, or signs of infection (pain, swelling, redness, odor or green/yellow discharge around incision site)     Notify your health care provider if you experience any of the following:  increased confusion or weakness     Notify your health care provider if you experience any of the following:  difficulty breathing or increased cough     Change dressing (specify)   Order Comments: Wash wounds daily with soap and water, shower is okay.  Do not submerge under water.  No baths or swimming.  Keep wounds covered and dry and clean         Pending Diagnostic Studies:       Procedure Component Value Units Date/Time    EKG 12-lead [[id number]] Collected: 02/07/23 0415    Order Status: Sent Lab Status: In process Updated: 02/07/23 1315    Narrative:      Test Reason : R07.89,    Vent. Rate : 079 BPM     Atrial Rate : 079 BPM     P-R Int : 132 ms          QRS Dur : 080 ms      QT Int : 384 ms       P-R-T Axes : -09 011 019 degrees     QTc Int : 440 ms    Normal sinus rhythm with sinus arrhythmia  Normal ECG  No previous ECGs available    Referred By: [email or web address]   SELF           Confirmed By:            Medications:  Reconciled Home Medications:      Medication List        START taking these medications      amoxicillin-clavulanate 875-125mg 875-125 mg per tablet  Commonly known as:  AUGMENTIN  Take 1 tablet by mouth every 12 (twelve) hours.     ondansetron 4 MG Tbdl  Commonly known as: ZOFRAN-ODT  Take 1 tablet (4 mg total) by mouth every 6 (six) hours as needed (nausea).     oxyCODONE 5 MG immediate release tablet  Commonly known as: ROXICODONE  Take 1 tablet (5 mg total) by mouth every 4 (four) hours as needed for Pain.            STOP taking these medications      ibuprofen 100 MG tablet  Commonly known as: ADVIL,MOTRIN              Indwelling Lines/Drains at time of discharge:   Lines/Drains/Airways       Arterial Line  Duration             Arterial Line 02/06/23 1040 Left Radial 1 day                    Time spent on the discharge of patient: 35 minutes         Sander Anthony MD  Department of Hospital Medicine  [facility]

## 2023-02-07 NOTE — NURSING
Pt experiencing chest tightness; No flatence since the CHOLECYSTECTOMY, LAPAROSCOPIC done on 02/06/2023; pt screaming out in pain & states she feels as if she can't breathe;anxiety high,walking around room and screaming for help;;Boyfriend @ bedside; Ativan given,2L NC O2 for comfort;aluminum-magnesium hydroxide-simethicone 200-200-20 mg/5 mL suspension 30ml given;EKG done-Normal sinus rhythm;  02/07/23 @ 0333 pt has calmed down ;sitting on the edge of bed;pt states she is still in pain oxyCODONE-acetaminophen 5-325 mg given

## 2023-02-08 ENCOUNTER — TELEPHONE (OUTPATIENT)
Dept: SURGERY | Facility: CLINIC | Age: 23
End: 2023-02-08

## 2023-02-08 NOTE — TELEPHONE ENCOUNTER
I called and spoke to patients boyfriend about the gas.  She taking GasX and hasn't had a bowel movement yet.  She's taking her pain medication. Her surgery was on 2/6/23.  I informed him that Dr. Burger said that theres no restrictions on what she can eat.  I informed him to call if he has anymore questions. Donald

## 2023-02-08 NOTE — TELEPHONE ENCOUNTER
----- Message from Puja Tijerina sent at 2/8/2023  4:19 PM CST -----  Contact: pt'daniel Evans [phone]  Type: Needs Medical Advice  Who Called:  pt  Best Call Back Number: [phone]  Additional Information: pt's bf is calling to get clarification on gf's after care she recently had a procedure on the 6th and they need some clarifications. Please call and advise.Thanks

## 2023-06-26 ENCOUNTER — HOSPITAL ENCOUNTER (EMERGENCY)
Facility: HOSPITAL | Age: 23
Discharge: HOME | End: 2023-06-26
Attending: EMERGENCY MEDICINE

## 2023-06-26 VITALS
HEIGHT: 68 IN | BODY MASS INDEX: 33.34 KG/M2 | WEIGHT: 220 LBS | TEMPERATURE: 99 F | HEART RATE: 76 BPM | RESPIRATION RATE: 16 BRPM | SYSTOLIC BLOOD PRESSURE: 126 MMHG | DIASTOLIC BLOOD PRESSURE: 58 MMHG | OXYGEN SATURATION: 98 %

## 2023-06-26 DIAGNOSIS — R10.11 RUQ ABDOMINAL PAIN: Primary | ICD-10-CM

## 2023-06-26 LAB
ALBUMIN SERPL BCP-MCNC: 4.2 G/DL (ref 3.5–5.2)
ALP SERPL-CCNC: 50 U/L (ref 55–135)
ALT SERPL W/O P-5'-P-CCNC: 18 U/L (ref 10–44)
ANION GAP SERPL CALC-SCNC: 8 MMOL/L (ref 8–16)
AST SERPL-CCNC: 22 U/L (ref 10–40)
B-HCG UR QL: NEGATIVE
BASOPHILS # BLD AUTO: 0.03 K/UL (ref 0–0.2)
BASOPHILS NFR BLD: 0.4 % (ref 0–1.9)
BILIRUB SERPL-MCNC: 0.7 MG/DL (ref 0.1–1)
BILIRUB UR QL STRIP: NEGATIVE
BUN SERPL-MCNC: 10 MG/DL (ref 6–20)
CALCIUM SERPL-MCNC: 9.3 MG/DL (ref 8.7–10.5)
CHLORIDE SERPL-SCNC: 105 MMOL/L (ref 95–110)
CLARITY UR: CLEAR
CO2 SERPL-SCNC: 26 MMOL/L (ref 23–29)
COLOR UR: YELLOW
CREAT SERPL-MCNC: 1 MG/DL (ref 0.5–1.4)
CTP QC/QA: YES
DIFFERENTIAL METHOD: NORMAL
EOSINOPHIL # BLD AUTO: 0.1 K/UL (ref 0–0.5)
EOSINOPHIL NFR BLD: 1.1 % (ref 0–8)
ERYTHROCYTE [DISTWIDTH] IN BLOOD BY AUTOMATED COUNT: 11.9 % (ref 11.5–14.5)
EST. GFR  (NO RACE VARIABLE): >60 ML/MIN/1.73 M^2
GLUCOSE SERPL-MCNC: 93 MG/DL (ref 70–110)
GLUCOSE UR QL STRIP: NEGATIVE
HCT VFR BLD AUTO: 40.1 % (ref 37–48.5)
HGB BLD-MCNC: 13.5 G/DL (ref 12–16)
HGB UR QL STRIP: NEGATIVE
IMM GRANULOCYTES # BLD AUTO: 0.02 K/UL (ref 0–0.04)
IMM GRANULOCYTES NFR BLD AUTO: 0.3 % (ref 0–0.5)
KETONES UR QL STRIP: NEGATIVE
LEUKOCYTE ESTERASE UR QL STRIP: NEGATIVE
LIPASE SERPL-CCNC: 30 U/L (ref 4–60)
LYMPHOCYTES # BLD AUTO: 1.9 K/UL (ref 1–4.8)
LYMPHOCYTES NFR BLD: 26.6 % (ref 18–48)
MCH RBC QN AUTO: 30.7 PG (ref 27–31)
MCHC RBC AUTO-ENTMCNC: 33.7 G/DL (ref 32–36)
MCV RBC AUTO: 91 FL (ref 82–98)
MONOCYTES # BLD AUTO: 0.4 K/UL (ref 0.3–1)
MONOCYTES NFR BLD: 6 % (ref 4–15)
NEUTROPHILS # BLD AUTO: 4.7 K/UL (ref 1.8–7.7)
NEUTROPHILS NFR BLD: 65.6 % (ref 38–73)
NITRITE UR QL STRIP: NEGATIVE
NRBC BLD-RTO: 0 /100 WBC
PH UR STRIP: 7 [PH] (ref 5–8)
PLATELET # BLD AUTO: 273 K/UL (ref 150–450)
PMV BLD AUTO: 11.1 FL (ref 9.2–12.9)
POTASSIUM SERPL-SCNC: 3.7 MMOL/L (ref 3.5–5.1)
PROT SERPL-MCNC: 7.7 G/DL (ref 6–8.4)
PROT UR QL STRIP: NEGATIVE
RBC # BLD AUTO: 4.4 M/UL (ref 4–5.4)
SODIUM SERPL-SCNC: 139 MMOL/L (ref 136–145)
SP GR UR STRIP: 1.01 (ref 1–1.03)
URN SPEC COLLECT METH UR: NORMAL
UROBILINOGEN UR STRIP-ACNC: NEGATIVE EU/DL
WBC # BLD AUTO: 7.15 K/UL (ref 3.9–12.7)

## 2023-06-26 PROCEDURE — 85025 COMPLETE CBC W/AUTO DIFF WBC: CPT | Performed by: NURSE PRACTITIONER

## 2023-06-26 PROCEDURE — 81003 URINALYSIS AUTO W/O SCOPE: CPT | Performed by: NURSE PRACTITIONER

## 2023-06-26 PROCEDURE — 99285 EMERGENCY DEPT VISIT HI MDM: CPT | Mod: 25

## 2023-06-26 PROCEDURE — 25000003 PHARM REV CODE 250: Performed by: EMERGENCY MEDICINE

## 2023-06-26 PROCEDURE — 63600175 PHARM REV CODE 636 W HCPCS: Performed by: EMERGENCY MEDICINE

## 2023-06-26 PROCEDURE — 81025 URINE PREGNANCY TEST: CPT | Performed by: NURSE PRACTITIONER

## 2023-06-26 PROCEDURE — 96374 THER/PROPH/DIAG INJ IV PUSH: CPT

## 2023-06-26 PROCEDURE — 83690 ASSAY OF LIPASE: CPT | Performed by: NURSE PRACTITIONER

## 2023-06-26 PROCEDURE — 80053 COMPREHEN METABOLIC PANEL: CPT | Performed by: NURSE PRACTITIONER

## 2023-06-26 RX ORDER — PANTOPRAZOLE SODIUM 40 MG/1
40 TABLET, DELAYED RELEASE ORAL DAILY
Qty: 30 TABLET | Refills: 11 | Status: SHIPPED | OUTPATIENT
Start: 2023-06-26 | End: 2024-06-25

## 2023-06-26 RX ORDER — MAG HYDROX/ALUMINUM HYD/SIMETH 200-200-20
30 SUSPENSION, ORAL (FINAL DOSE FORM) ORAL ONCE
Status: COMPLETED | OUTPATIENT
Start: 2023-06-26 | End: 2023-06-26

## 2023-06-26 RX ORDER — KETOROLAC TROMETHAMINE 30 MG/ML
15 INJECTION, SOLUTION INTRAMUSCULAR; INTRAVENOUS
Status: COMPLETED | OUTPATIENT
Start: 2023-06-26 | End: 2023-06-26

## 2023-06-26 RX ORDER — LIDOCAINE HYDROCHLORIDE 20 MG/ML
15 SOLUTION OROPHARYNGEAL ONCE
Status: COMPLETED | OUTPATIENT
Start: 2023-06-26 | End: 2023-06-26

## 2023-06-26 RX ADMIN — KETOROLAC TROMETHAMINE 15 MG: 30 INJECTION, SOLUTION INTRAMUSCULAR; INTRAVENOUS at 11:06

## 2023-06-26 RX ADMIN — ALUMINUM HYDROXIDE, MAGNESIUM HYDROXIDE, AND SIMETHICONE 30 ML: 200; 200; 20 SUSPENSION ORAL at 11:06

## 2023-06-26 RX ADMIN — LIDOCAINE HYDROCHLORIDE 15 ML: 20 SOLUTION ORAL; TOPICAL at 11:06

## 2023-06-26 NOTE — DISCHARGE INSTRUCTIONS
Return to the ER for worsened pain, or for any other concerns.  Follow-up with gastroenterology clinic.

## 2023-06-26 NOTE — ED PROVIDER NOTES
Encounter Date: 6/26/2023       History     Chief Complaint   Patient presents with    Post-op Problem     5 MOS AGO, GB REMOVED    Abdominal Pain     22-year-old female complaining of right upper abdominal pain.  Pain has been intermittent the last several months.  It is the same pain attributed to her gallbladder which she had removed earlier this year.  The pain has worsened in the last 3 days.  It is sharp and intermittent.  She has no associated symptoms.  No nausea, vomiting, diarrhea, dysuria, hematuria, etc..  She denies any exacerbating/alleviating factors.  She has been eating well with normal appetite and it does not affect the pain.    The history is provided by the patient.   Review of patient's allergies indicates:  No Known Allergies  No past medical history on file.  Past Surgical History:   Procedure Laterality Date    LAPAROSCOPIC CHOLECYSTECTOMY N/A 2/6/2023    Procedure: CHOLECYSTECTOMY, LAPAROSCOPIC;  Surgeon: Nathan Burger MD;  Location: [facility];  Service: General;  Laterality: N/A;     Family History   Problem Relation Age of Onset    Stroke Maternal Grandfather      Social History     Tobacco Use    Smoking status: Passive Smoke Exposure - Never Smoker    Tobacco comments:     step mother smokes outside only     Review of Systems   Gastrointestinal:  Positive for abdominal pain.   All other systems reviewed and are negative.    Physical Exam     Initial Vitals [06/26/23 0926]   BP Pulse Resp Temp SpO2   (!) 170/86 86 18 99.3 °F (37.4 °C) 100 %      MAP       --         Physical Exam    Nursing note and vitals reviewed.  Constitutional: She appears well-developed and well-nourished. She is not diaphoretic. No distress.   HENT:   Head: Normocephalic.   Eyes: Conjunctivae are normal.   Neck: Neck supple.   Normal range of motion.  Cardiovascular:  Normal rate.           Pulmonary/Chest: No respiratory distress.   Abdominal: She exhibits no distension. There is abdominal tenderness.    Epigastric and right upper abdominal tenderness.  No rebound or guarding   Musculoskeletal:         General: No edema.      Cervical back: Normal range of motion and neck supple.     Neurological: She is alert. She has normal strength. GCS eye subscore is 4. GCS verbal subscore is 5. GCS motor subscore is 6.   Skin: Skin is warm and dry.   Psychiatric: She has a normal mood and affect.       ED Course   Procedures  Labs Reviewed   COMPREHENSIVE METABOLIC PANEL - Abnormal; Notable for the following components:       Result Value    Alkaline Phosphatase 50 (*)     All other components within normal limits   CBC W/ AUTO DIFFERENTIAL   LIPASE   URINALYSIS, REFLEX TO URINE CULTURE    Narrative:     Specimen Source->Urine   POCT URINE PREGNANCY          Imaging Results              US Abdomen Limited (Final result)  Result time 06/26/23 10:12:32      Final result by Evert Ford MD (06/26/23 10:12:32)                   Narrative:    Abdominal ultrasound Limited    CLINICAL DATA: Right upper quadrant pain, cholecystectomy 5 months prior    FINDINGS: Sonographic assessment targeted to the right upper quadrant was performed.    The pancreas, aorta, and inferior vena cava are normal in appearance.    The liver demonstrates homogeneous echotexture and no evidence of mass or contour abnormality. Hepatopedal flow is noted within the portal vein. The gallbladder is absent. Common bile duct is normal in caliber at 2 mm.    There is no right upper quadrant free fluid. Mildly prominent right renal pelvis is noted, without calyceal dilation, likely reflecting developmental variant extrarenal pelvis.    IMPRESSION:  1. No acute abnormalities in the right upper quadrant.  2. Surgical absence of the gallbladder.    Electronically signed by:  Evert Ford MD  6/26/2023 10:12 AM CDT Workstation: [id number]                                     Medications   ketorolac injection 15 mg (15 mg Intravenous Given 6/26/23 8264)    aluminum-magnesium hydroxide-simethicone 200-200-20 mg/5 mL suspension 30 mL (30 mLs Oral Given 6/26/23 1102)     And   LIDOcaine HCl 2% oral solution 15 mL (15 mLs Oral Given 6/26/23 1103)     Medical Decision Making:   Initial Assessment:   72-year-old female with recurrent right upper abdominal pain.  Abdominal exam is benign.  She has no leukocytosis.  LFTs, lipase are normal.  UA shows no infection or hematuria.  Right upper quadrant ultrasound is negative for acute process.  Patient was given IV Toradol and GI cocktail.  Unsure etiology of her pain.  She is well-appearing and I do not think she needs further emergent diagnostic evaluation.  Will start on oral Protonix and referred to GI clinic.                        Clinical Impression:   Final diagnoses:  [R10.11] RUQ abdominal pain (Primary)        ED Disposition Condition    Discharge Stable          ED Prescriptions       Medication Sig Dispense Start Date End Date Auth. Provider    pantoprazole (PROTONIX) 40 MG tablet Take 1 tablet (40 mg total) by mouth once daily. 30 tablet 6/26/2023 6/25/2024 Donal Kemp MD          Follow-up Information       Follow up With Specialties Details Why Contact Info Additional Information    Justin Saldaña MD Gastroenterology   [address] [facility]  [facility]  [facility] [address] [phone]       [facility] - Emergency Dept Emergency Medicine  As needed, If symptoms worsen [address] [facility] [id number] [phone] 1st floor             Donal Kemp MD  06/26/23 3454

## 2023-06-26 NOTE — FIRST PROVIDER EVALUATION
Medical screening examination initiated.  I have conducted a focused provider triage encounter, findings are as follows:    Brief history of present illness:  Had gallbladder out 5 months ago and has had similar pain in the epigastric/RUQ for the past several days. Denies N/V/D, fever    There were no vitals filed for this visit.    Pertinent physical exam:  Epigastric, RUQ tenderness    Brief workup plan:  Labs, imaging    Preliminary workup initiated; this workup will be continued and followed by the physician or advanced practice provider that is assigned to the patient when roomed.

## 2023-08-30 ENCOUNTER — TELEPHONE (OUTPATIENT)
Dept: GASTROENTEROLOGY | Facility: CLINIC | Age: 23
End: 2023-08-30

## (undated) DEVICE — GOWN SURGICAL BASICS XL

## (undated) DEVICE — SCISSORS CURVED WITH UNIPOLAR CAUTERY

## (undated) DEVICE — TRAY GENERAL LAPAROSCOPY

## (undated) DEVICE — PAD BOVIE ADULT

## (undated) DEVICE — SUTURE VICRYL #0 27 UR-6 VCP603H

## (undated) DEVICE — SUTURE MONOCRYL 4-0 PS-2 27 MCP426H

## (undated) DEVICE — GLOVE BIOGELPI GOLD SIZE 7.5

## (undated) DEVICE — ADHESIVE DERMABOND SKIN DNX12

## (undated) DEVICE — SLEEVE TROCAR ENDOPATH XCEL

## (undated) DEVICE — SOLUTION IRRI H2O BOTTLE 1000ML

## (undated) DEVICE — CABLE MONOPOLAR 10FT DISPOSABLE

## (undated) DEVICE — ADHESIVE TISSUE EXOFIN

## (undated) DEVICE — DISSECTOR KITTNER 13300

## (undated) DEVICE — NEEDLE INSUFFLATION 120MM 172015

## (undated) DEVICE — STRAP TABLE 5X72 M5173

## (undated) DEVICE — TROCAR OPTICAL ZTHREAD 12MMX100MM CTF73

## (undated) DEVICE — APPLIER CLIP  5MM

## (undated) DEVICE — SOLUTION IRRI NS BOTTLE 1000ML R5200-01

## (undated) DEVICE — TROCAR ENDOPATH XCEL BLADELESS B5LT